# Patient Record
Sex: MALE | Race: WHITE | HISPANIC OR LATINO | Employment: FULL TIME | URBAN - METROPOLITAN AREA
[De-identification: names, ages, dates, MRNs, and addresses within clinical notes are randomized per-mention and may not be internally consistent; named-entity substitution may affect disease eponyms.]

---

## 2019-11-20 ENCOUNTER — HOSPITAL ENCOUNTER (OUTPATIENT)
Dept: RADIOLOGY | Facility: HOSPITAL | Age: 64
Discharge: HOME | End: 2019-11-20
Attending: ORTHOPAEDIC SURGERY
Payer: COMMERCIAL

## 2019-11-20 ENCOUNTER — OFFICE VISIT (OUTPATIENT)
Dept: SPORTS MEDICINE | Facility: CLINIC | Age: 64
End: 2019-11-20
Payer: COMMERCIAL

## 2019-11-20 VITALS
HEIGHT: 65 IN | WEIGHT: 182 LBS | SYSTOLIC BLOOD PRESSURE: 123 MMHG | BODY MASS INDEX: 30.32 KG/M2 | DIASTOLIC BLOOD PRESSURE: 72 MMHG | HEART RATE: 90 BPM

## 2019-11-20 DIAGNOSIS — M25.512 LEFT SHOULDER PAIN, UNSPECIFIED CHRONICITY: ICD-10-CM

## 2019-11-20 DIAGNOSIS — S46.012A TRAUMATIC COMPLETE TEAR OF LEFT ROTATOR CUFF, INITIAL ENCOUNTER: Primary | ICD-10-CM

## 2019-11-20 PROCEDURE — 99203 OFFICE O/P NEW LOW 30 MIN: CPT | Mod: S$GLB,,, | Performed by: ORTHOPAEDIC SURGERY

## 2019-11-20 PROCEDURE — 3008F BODY MASS INDEX DOCD: CPT | Mod: CPTII,S$GLB,, | Performed by: ORTHOPAEDIC SURGERY

## 2019-11-20 PROCEDURE — 99203 PR OFFICE/OUTPT VISIT, NEW, LEVL III, 30-44 MIN: ICD-10-PCS | Mod: S$GLB,,, | Performed by: ORTHOPAEDIC SURGERY

## 2019-11-20 PROCEDURE — 73030 X-RAY EXAM OF SHOULDER: CPT | Mod: TC,LT

## 2019-11-20 PROCEDURE — 99999 PR PBB SHADOW E&M-NEW PATIENT-LVL III: CPT | Mod: PBBFAC,,, | Performed by: ORTHOPAEDIC SURGERY

## 2019-11-20 PROCEDURE — 3008F PR BODY MASS INDEX (BMI) DOCUMENTED: ICD-10-PCS | Mod: CPTII,S$GLB,, | Performed by: ORTHOPAEDIC SURGERY

## 2019-11-20 PROCEDURE — 73030 XR SHOULDER COMPLETE 2 OR MORE VIEWS LEFT: ICD-10-PCS | Mod: 26,LT,, | Performed by: RADIOLOGY

## 2019-11-20 PROCEDURE — 99999 PR PBB SHADOW E&M-NEW PATIENT-LVL III: ICD-10-PCS | Mod: PBBFAC,,, | Performed by: ORTHOPAEDIC SURGERY

## 2019-11-20 PROCEDURE — 73030 X-RAY EXAM OF SHOULDER: CPT | Mod: 26,LT,, | Performed by: RADIOLOGY

## 2019-11-20 RX ORDER — TRAMADOL HYDROCHLORIDE 50 MG/1
50 TABLET ORAL EVERY 4 HOURS PRN
Qty: 30 TABLET | Refills: 0 | Status: SHIPPED | OUTPATIENT
Start: 2019-11-20 | End: 2019-11-22

## 2019-11-20 NOTE — PROGRESS NOTES
CC: left Shoulder pain    64 y.o. 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  He has not attempted any physical therapy he has tried some anti-inflammatories which does not help pain.  He had an MRI which shows full-thickness supraspinatus tear as well as slap and biceps tendinitis.  He reports that the pain is severe and not responding to any conservative care.      +trauma    He reports that the pain is worse with overhead activity. It also bothers him at night.    Is affecting ADLs.     PAST MEDICAL HISTORY:   Past Medical History:   Diagnosis Date    High cholesterol     Hypertension     Hypertrophy of prostate without urinary obstruction and other lower urinary tract symptoms (LUTS)     Nocturia     Type II or unspecified type diabetes mellitus without mention of complication, not stated as uncontrolled     Unspecified disorder of male genital organs      PAST SURGICAL HISTORY:   Past Surgical History:   Procedure Laterality Date    KNEE SURGERY       FAMILY HISTORY:   Family History   Family history unknown: Yes     SOCIAL HISTORY:   Social History     Socioeconomic History    Marital status:      Spouse name: Not on file    Number of children: Not on file    Years of education: Not on file    Highest education level: Not on file   Occupational History    Not on file   Social Needs    Financial resource strain: Not on file    Food insecurity:     Worry: Not on file     Inability: Not on file    Transportation needs:     Medical: Not on file     Non-medical: Not on file   Tobacco Use    Smoking status: Never Smoker    Smokeless tobacco: Never Used   Substance and Sexual Activity    Alcohol use: No    Drug use: No    Sexual activity: Yes   Lifestyle    Physical activity:     Days per week: Not on  "file     Minutes per session: Not on file    Stress: Not on file   Relationships    Social connections:     Talks on phone: Not on file     Gets together: Not on file     Attends [employer] service: Not on file     Active member of club or organization: Not on file     Attends meetings of clubs or organizations: Not on file     Relationship status: Not on file   Other Topics Concern    Not on file   Social History Narrative    Not on file     MEDICATIONS:   Current Outpatient Medications:     aspirin (ECOTRIN) 81 MG EC tablet, Take 81 mg by mouth once daily., Disp: , Rfl:     lisinopril 10 MG tablet, Take 10 mg by mouth once daily., Disp: , Rfl:     metformin (GLUCOPHAGE) 500 MG tablet, Take 500 mg by mouth 2 (two) times daily with meals., Disp: , Rfl:     nitroGLYCERIN (NITROSTAT) 0.4 MG SL tablet, Place 0.4 mg under the tongue every 5 (five) minutes as needed for Chest pain., Disp: , Rfl:     pravastatin (PRAVACHOL) 10 MG tablet, Take 10 mg by mouth once daily., Disp: , Rfl:     saxagliptin-metformin (KOMBIGLYZE XR) 5-1,000 mg TM24, Take by mouth., Disp: , Rfl:     tadalafil (CIALIS) 5 MG tablet, Take 1 tablet (5 mg total) by mouth daily as needed for Erectile Dysfunction., Disp: 30 tablet, Rfl: 11  ALLERGIES: Review of patient's allergies indicates:  No Known Allergies  VITAL SIGNS: /72   Pulse 90   Ht 5' 5" (1.651 m)   Wt 82.6 kg (182 lb)   BMI 30.29 kg/m²     Review of Systems   Constitution: Negative. Negative for chills, fever and night sweats.   HENT: Negative for congestion and headaches.    Eyes: Negative for blurred vision, left vision loss and right vision loss.   Cardiovascular: Negative for chest pain and syncope.   Respiratory: Negative for cough and shortness of breath.    Endocrine: Negative for polydipsia, polyphagia and polyuria.   Hematologic/Lymphatic: Negative for bleeding problem. Does not bruise/bleed easily.   Skin: Negative for dry skin, itching and rash. "   Musculoskeletal: Negative for falls and muscle weakness.   Gastrointestinal: Negative for abdominal pain and bowel incontinence.   Genitourinary: Negative for bladder incontinence and nocturia.   Neurological: Negative for disturbances in coordination, loss of balance and seizures.   Psychiatric/Behavioral: Negative for depression. The patient does not have insomnia.    Allergic/Immunologic: Negative for hives and persistent infections.       PHYSICAL EXAMINATION:  General:  The patient is alert and oriented x 3.  Mood is pleasant.  Observation of ears, eyes and nose reveal no gross abnormalities.  HEENT: NCAT, sclera nonicteric  Lungs: Respirations are equal and unlabored.  Gait is coordinated. Patient can toe walk and heel walk without difficulty.  Cardiovascular: There are no swelling or varicosities present.   Lymphatic: Negative for adenopathy       left Shoulder / Upper Extremity Exam    OBSERVATION:     Swelling  none  Deformity  none   Discoloration  none   Scapular winging none   Scars   none  Atrophy  none    TENDERNESS / CREPITUS (T/C):          T/C      T/C   Clavicle   -/-  SUPRAspinatus    -/-     AC Jt.    -/-  INFRAspinatus  -/-     SC Jt.    -/-  Deltoid    -/-     G. Tuberosity  -/-  LH BICEP groove  +/-   Acromion:  -/-  Midline Neck   -/-     Scapular Spine -/-  Trapezium   -/-   SMA Scapula  -/-  GH jt. line - post  -/-     Scapulothoracic  -/-         ROM: (* = with pain)  Right shoulder   Left shoulder        AROM (PROM)   AROM (PROM)   FE    90° (175°)     170° (175°)     ER at 0°    30°  (65°)    60°  (65°)   ER at 90° ABD  90°  (90°)    90°  (90°)°)      IR (spine level)   L3     T10    STRENGTH: (* = with pain) RIGHT SHOULDER  LEFT SHOULDER   SCAPTION at 0 *  5/5    4-/5    SCAPTION at 30 *  5/5    4-/5   IR    5/5    5/5   ER    5/5    4-/5   BICEPS   5/5    5/5   Deltoid    5/5    5/5     SIGNS:  Painful side       NEER   +   OALYSSAS  pos     STATON   +   SPEEDS  neg     DROP  ARM   neg   BELLY PRESS neg   Superior escape none    LIFT-OFF  neg   X-Body ADD    neg    MOVING VALGUS Neg        STABILITY TESTING    RIGHT SHOULDER   LEFT SHOULDER     Translation     Anterior  up face     up face     Posterior  up face    up face    Sulcus   < 10mm    < 10 mm     Signs    Apprehension   neg      neg      Relocation   no change     no change     Jerk test  neg     neg    EXTREMITY NEURO-VASCULAR EXAM    Sensation grossly intact to light touch all dermatomal regions.    DTR 2+ Biceps, Triceps, BR and Negative Aarons sign   Grossly intact motor function at Elbow, Wrist and Hand   Distal pulses radial and ulnar 2+, brisk cap refill, symmetric.      NECK:  Painless FROM and spinous processes non-tender. Negative Spurlings sign.      OTHER FINDINGS:  +scapular dyskinesia    XRAYS:  Shoulder trauma series left,  were obtained and reviewed  No convincing fracture or dislocation is noted. The osseous structures appear well mineralized and well aligned.    MRI Left shoulder: Full thickness supraspinatus tear without atrophy or retraction. SLAP, biceps tendinitis.       ASSESSMENT:    Left Shoulder Rotator Cuff Tear, SLAP, biceps tendonitis.      he would benefit from an arthroscopy, given the above.       PLAN:   Left Shoulder rotator cuff tear     All questions were answered, pt will contact us for questions or concerns in the interim.  Had thorough discussion of non-operative vs operative intervention, and risks and benefits of both.     We have discussed the surgery and recovery of arthroscopic shoulder surgery. he understands that there may be limited mobility up to several weeks after surgery depending on procedures that are performed at the time of surgery.    The spectrum of treatment options were discussed with the patient, including nonoperative and operative options.  After thorough discussion, the patient has elected to undergo surgical treatment to include:    left   a. Shoulder  arthroscopic rotator cuff repair   b. Shoulder arthroscopic SAD   c. Shoulder arthroscopic extensive debridement   d. Shoulder arthroscopic biceps tenodesis    e. Shoulder arthroscopic possible microfracture   f. Shoulder arthroscopic possible lysis of adhesions      The details of the surgical procedure were explained, including the location of probable incisions and a description of likely hardware and/or grafts to be used.  The patient understands the likely convalescence after surgery.  Also, we have thoroughly discussed the risks, benefits and alternatives to surgery, including, but not limited to, the risk of infection, joint stiffness, blood clot (including DVT and/or pulmonary embolus), neurologic and vascular injury.  It was explained that, if tissue has been repaired or reconstructed, there is a chance of failure, which may require further management.  Consent form for surgery is signed and in chart.    These risks include but are not limited to: bleeding, infection, scarring, re-tear of repair, irreparability of the tear, damage to neurovascular structures, damage to cartilage, stiffness, blood clots, pulmonary embolism, swelling, compartment syndrome, need for further surgery, and the risks of anesthesia. She verbalized her understanding of these risks and wished to proceed with surgery.   Time was spent face-to-face with the patient during this encounter on counseling about treatment options including surgery and coordination of her care for preoperative visits, surgery and post-operative rehab.

## 2019-11-21 DIAGNOSIS — S43.432A SUPERIOR GLENOID LABRUM LESION OF LEFT SHOULDER, INITIAL ENCOUNTER: ICD-10-CM

## 2019-11-21 DIAGNOSIS — S46.012A TRAUMATIC TEAR OF LEFT ROTATOR CUFF, UNSPECIFIED TEAR EXTENT, INITIAL ENCOUNTER: Primary | ICD-10-CM

## 2019-11-21 DIAGNOSIS — M75.22 BICEPS TENDINITIS OF LEFT UPPER EXTREMITY: ICD-10-CM

## 2019-11-22 PROBLEM — E78.5 HYPERLIPIDEMIA: Status: ACTIVE | Noted: 2019-11-22

## 2019-11-22 PROBLEM — E11.9 DIABETES MELLITUS WITHOUT COMPLICATION: Status: ACTIVE | Noted: 2019-11-22

## 2019-11-22 PROBLEM — G47.00 INSOMNIA: Status: ACTIVE | Noted: 2019-11-22

## 2019-11-22 PROBLEM — F41.9 ANXIETY: Status: ACTIVE | Noted: 2019-11-22

## 2019-11-22 PROBLEM — I10 HYPERTENSION, ESSENTIAL: Status: ACTIVE | Noted: 2019-11-22

## 2019-12-26 ENCOUNTER — TELEPHONE (OUTPATIENT)
Dept: SPORTS MEDICINE | Facility: CLINIC | Age: 64
End: 2019-12-26

## 2019-12-26 NOTE — TELEPHONE ENCOUNTER
----- Message from Keerthi Sharp sent at 12/26/2019  4:15 PM CST -----  Contact: pt@ [phone]  Calling to speak with someone in Dr. Covarrubias's office regarding his surgery date, says it was changed to 1/9, but Epic has not been updated to reflect the change. Please call.

## 2019-12-26 NOTE — TELEPHONE ENCOUNTER
I called the patient and apologized for the error, I informed him that I would get his surgery rescheduled to 1/9/20

## 2020-01-06 ENCOUNTER — OFFICE VISIT (OUTPATIENT)
Dept: SPORTS MEDICINE | Facility: CLINIC | Age: 65
End: 2020-01-06
Payer: COMMERCIAL

## 2020-01-06 VITALS
HEART RATE: 104 BPM | SYSTOLIC BLOOD PRESSURE: 137 MMHG | HEIGHT: 64 IN | WEIGHT: 171 LBS | DIASTOLIC BLOOD PRESSURE: 77 MMHG | BODY MASS INDEX: 29.19 KG/M2

## 2020-01-06 DIAGNOSIS — M75.22 BICEPS TENDINITIS OF LEFT UPPER EXTREMITY: ICD-10-CM

## 2020-01-06 DIAGNOSIS — S43.432A SUPERIOR GLENOID LABRUM LESION OF LEFT SHOULDER, INITIAL ENCOUNTER: ICD-10-CM

## 2020-01-06 DIAGNOSIS — M25.512 LEFT SHOULDER PAIN, UNSPECIFIED CHRONICITY: ICD-10-CM

## 2020-01-06 DIAGNOSIS — S46.012A TRAUMATIC TEAR OF LEFT ROTATOR CUFF, UNSPECIFIED TEAR EXTENT, INITIAL ENCOUNTER: Primary | ICD-10-CM

## 2020-01-06 DIAGNOSIS — G89.18 POST-OPERATIVE PAIN: ICD-10-CM

## 2020-01-06 PROCEDURE — 99499 UNLISTED E&M SERVICE: CPT | Mod: S$GLB,,, | Performed by: PHYSICIAN ASSISTANT

## 2020-01-06 PROCEDURE — 99499 NO LOS: ICD-10-PCS | Mod: S$GLB,,, | Performed by: PHYSICIAN ASSISTANT

## 2020-01-06 PROCEDURE — 99999 PR PBB SHADOW E&M-EST. PATIENT-LVL IV: CPT | Mod: PBBFAC,,, | Performed by: PHYSICIAN ASSISTANT

## 2020-01-06 PROCEDURE — 99999 PR PBB SHADOW E&M-EST. PATIENT-LVL IV: ICD-10-PCS | Mod: PBBFAC,,, | Performed by: PHYSICIAN ASSISTANT

## 2020-01-06 RX ORDER — TRAMADOL HYDROCHLORIDE 50 MG/1
50-100 TABLET ORAL EVERY 6 HOURS PRN
Qty: 21 TABLET | Refills: 0 | Status: SHIPPED | OUTPATIENT
Start: 2020-01-06 | End: 2020-02-21

## 2020-01-06 RX ORDER — PROMETHAZINE HYDROCHLORIDE 25 MG/1
25 TABLET ORAL EVERY 6 HOURS PRN
Qty: 12 TABLET | Refills: 0 | Status: SHIPPED | OUTPATIENT
Start: 2020-01-06 | End: 2020-09-01

## 2020-01-06 RX ORDER — OXYCODONE AND ACETAMINOPHEN 10; 325 MG/1; MG/1
TABLET ORAL
Qty: 21 TABLET | Refills: 0 | Status: SHIPPED | OUTPATIENT
Start: 2020-01-06 | End: 2020-01-24 | Stop reason: SDUPTHER

## 2020-01-08 ENCOUNTER — ANESTHESIA EVENT (OUTPATIENT)
Dept: SURGERY | Facility: HOSPITAL | Age: 65
End: 2020-01-08
Payer: COMMERCIAL

## 2020-01-08 RX ORDER — SODIUM CHLORIDE 9 MG/ML
INJECTION, SOLUTION INTRAVENOUS CONTINUOUS
Status: CANCELLED | OUTPATIENT
Start: 2020-01-08

## 2020-01-08 RX ORDER — OXYCODONE HCL 10 MG/1
10 TABLET, FILM COATED, EXTENDED RELEASE ORAL
Status: CANCELLED | OUTPATIENT
Start: 2020-01-08 | End: 2020-01-08

## 2020-01-08 NOTE — H&P
Chad Pringle  is here for a completion of his perioperative paperwork. he  Is scheduled to undergo     left              a. Shoulder arthroscopic rotator cuff repair              b. Shoulder arthroscopic SAD              c. Shoulder arthroscopic extensive debridement              d. Shoulder arthroscopic biceps tenodesis               e. Shoulder arthroscopic possible microfracture              f. Shoulder arthroscopic possible lysis of adhesions on 1/9/2020.      He is a healthy individual but does need clearance for this procedure which he states he has received from Dr. Franco. Had a negative cardiac stress test august 2019 which was normal. He is not having any CP, SOB, dyspnea at this time.     PAST MEDICAL HISTORY:   Past Medical History:   Diagnosis Date    High cholesterol     Hypertension     Hypertrophy of prostate without urinary obstruction and other lower urinary tract symptoms (LUTS)     Nocturia     Type II or unspecified type diabetes mellitus without mention of complication, not stated as uncontrolled     Unspecified disorder of male genital organs      PAST SURGICAL HISTORY:   Past Surgical History:   Procedure Laterality Date    KNEE SURGERY       FAMILY HISTORY:   Family History   Family history unknown: Yes     SOCIAL HISTORY:   Social History     Socioeconomic History    Marital status:      Spouse name: Not on file    Number of children: Not on file    Years of education: Not on file    Highest education level: Not on file   Occupational History    Not on file   Social Needs    Financial resource strain: Not on file    Food insecurity:     Worry: Not on file     Inability: Not on file    Transportation needs:     Medical: Not on file     Non-medical: Not on file   Tobacco Use    Smoking status: Never Smoker    Smokeless tobacco: Never Used   Substance and Sexual Activity    Alcohol use: No    Drug use: No    Sexual activity: Yes   Lifestyle    Physical activity:      Days per week: Not on file     Minutes per session: Not on file    Stress: Not on file   Relationships    Social connections:     Talks on phone: Not on file     Gets together: Not on file     Attends [employer] service: Not on file     Active member of club or organization: Not on file     Attends meetings of clubs or organizations: Not on file     Relationship status: Not on file   Other Topics Concern    Not on file   Social History Narrative    Not on file       MEDICATIONS:   Current Outpatient Medications:     ALPRAZolam (XANAX) 0.25 MG tablet, Take 1 tablet (0.25 mg total) by mouth daily as needed., Disp: 30 tablet, Rfl: 2    aspirin (ECOTRIN) 81 MG EC tablet, Take 81 mg by mouth once daily., Disp: , Rfl:     dapagliflozin (FARXIGA) 5 mg Tab tablet, Take 5 mg by mouth once daily., Disp: , Rfl:     gabapentin (NEURONTIN) 100 MG capsule, TAKE 1 CAPSULE BY MOUTH EVERY DAY AT NIGHT, Disp: 90 capsule, Rfl: 1    JANUMET XR 50-1,000 mg TM24, Take 1 tablet by mouth 2 (two) times daily., Disp: , Rfl: 2    lisinopril 10 MG tablet, Take 10 mg by mouth once daily., Disp: , Rfl:     nitroGLYCERIN (NITROSTAT) 0.4 MG SL tablet, Place 0.4 mg under the tongue every 5 (five) minutes as needed for Chest pain., Disp: , Rfl:     rosuvastatin (CRESTOR) 20 MG tablet, Take 20 mg by mouth every evening., Disp: , Rfl:     temazepam (RESTORIL) 30 mg capsule, Take 1 capsule (30 mg total) by mouth nightly as needed., Disp: 30 capsule, Rfl: 2    oxyCODONE-acetaminophen (PERCOCET)  mg per tablet, Take 1 tablet by mouth every 4-6 hours as needed for pain. Take stool softener with this medication., Disp: 21 tablet, Rfl: 0    promethazine (PHENERGAN) 25 MG tablet, Take 1 tablet (25 mg total) by mouth every 6 (six) hours as needed for Nausea., Disp: 12 tablet, Rfl: 0    traMADol (ULTRAM) 50 mg tablet, Take 1-2 tablets ( mg total) by mouth every 6 (six) hours as needed., Disp: 21 tablet, Rfl: 0  ALLERGIES: Review of  "patient's allergies indicates:  No Known Allergies    VITAL SIGNS: /77   Pulse 104   Ht 5' 4" (1.626 m)   Wt 77.6 kg (171 lb)   BMI 29.35 kg/m²      Risks, indications and benefits of the surgical procedure were discussed with the patient. All questions with regard to surgery, rehab, expected return to functional activities, activities of daily living and recreational endeavors were answered to his satisfaction.    It was explained to the patient that there may be an increase in surgical risks if the patient has certain co-morbidities such as but not limited to: Obesity, Cardiovascular issues (CHF, CAD, Arrhythmias), chronic pulmonary issues, previous or current neurovascular/neurological issues, previous strokes, diabetes mellitus, previous wound healing issues, previous wound or skin infections, PVD, clotting disorders, if the patient uses chronic steroids, if the patient takes or has immune compromising medications or diseases, or has previously or currently used tobacco products.     The patient verbalized that he/she does not have any additional clotting, bleeding, or blood disorders, other than what is list in her chart on today's review.     Then a brief history and physical exam were performed.    Review of Systems   Constitution: Negative. Negative for chills, fever and night sweats.   HENT: Negative for congestion and headaches.    Eyes: Negative for blurred vision, left vision loss and right vision loss.   Cardiovascular: Negative for chest pain and syncope.   Respiratory: Negative for cough and shortness of breath.    Endocrine: Negative for polydipsia, polyphagia and polyuria.   Hematologic/Lymphatic: Negative for bleeding problem. Does not bruise/bleed easily.   Skin: Negative for dry skin, itching and rash.   Musculoskeletal: Negative for falls and muscle weakness.   Gastrointestinal: Negative for abdominal pain and bowel incontinence.   Genitourinary: Negative for bladder incontinence and " nocturia.   Neurological: Negative for disturbances in coordination, loss of balance and seizures.   Psychiatric/Behavioral: Negative for depression. The patient does not have insomnia.    Allergic/Immunologic: Negative for hives and persistent infections.     PHYSICAL EXAM:  GEN: A&Ox3, WD WN NAD  HEENT: WNL  CHEST: CTAB, no W/R/R  HEART: RRR, no M/R/G  ABD: Soft, NT ND, BS x4 QUADS  MS; See Epic  NEURO: CN II-XII intact       The surgical consent was then reviewed with the patient, who agreed with all the contents of the consent form and it was signed. he was then given the surgery packet to bring with him to surgery center for the anesthesia portion of his perioperative paperwork (if needed)   For all physicians except for Dr. Hopper, we will email and possibly fax the consent forms and booking sheets to [facility].    The patient was given the opportunity to ask questions about the surgical plan and consent form, and once no other questions were asked, I proceeded with the pre-op appointment.    PHYSICAL THERAPY:  He was also instructed regarding physical therapy and will begin on  Likely 4 weeks after surgery. He was given a copy of the original prescription to schedule. Another copy of this prescription was also faxed to [facility].    POST OP CARE:instructions were reviewed including care of the wound and dressing after surgery and when he can shower.     CRUTCHES OR WALKER: It was explained to the patient that if they are having a lower extremity surgery that they will require either a walker or crutches to ambulate safely with after surgery. It was explained that a cane or other assistive devices are not sufficient to safely ambulate with after surgery. I explained to the patient that I will place an order for them to receive either crutches or a walker after surgery to go home with. It was explained that if they have crutches or a walker at home already, that they are REQUIRED to  bring them to the hospital on the day of surgery. It was explained that if they do not have them at the hospital on the day of surgery that they WILL be provided a new pair or crutches or a walker to go home with to ensure ambulation will be safe if the patient needs to stop somewhere on the way home.      PAIN MANAGEMENT: Chad Pringle was also given a pain management regime, which includes the TENS unit given to him by lemuel along with the education required for its use. He was also instructed regarding the Polar ice unit that will be in place after surgery and his postoperative pain medications.     PAIN MEDICATION:  Percocet 10/325mg 1 po q 4-6 hours prn pain  Ultram 50 mg Take 1-2 p.o. q.6 hours p.r.n. breakthrough pain,   Phenergan 25 mg one p.o. q.6 hours p.r.n. nausea and vomiting.    DVT prophylaxis was discussed with the patient today including risk factors for developing DVTs and history of DVTs. The patient was asked if any specific recommendations were given from the doctor/s that did pre-operative surgical clearance. The patient was then given an education sheet about DVTs and PE with warning signs and symptoms of both and steps to take if they suspect either of these.    This along with the Modified Caprini risk assessment model for VTE in general surgical patients was used to determine the patient's DVT risk.     From: Robert ARREGUIN, Ike DA, Stefania SM, et al. Prevention of VTE in nonorthopedic surgical patients: antithrombotic therapy and prevention of thrombosis, 9th ed: American College of Chest [employer]icians evidence-based clinical practical guidelines. Chest 2012; 141:e227S. Copyright © 2012. Reproduced with permission from the American College of Chest Physicians.    The below listed DVT prophylaxis regimen along with bilateral MISTY compression stockings will be used post-op. Length of treatment has been determined to be 10-42 days post-op by the above noted Caprini assessment model.     The patient was  instructed to buy and take:  Aspirin 81mg QD x 4 weeks for DVT prophylaxis starting on the morning after surgery.  Patient will also use bilateral TEDs on lower extremities, SCDs during surgery, and early ambulation post-op. If the patient was previously taking 81mg baby aspirin, they were told to not take it starting 5 days prior to surgery and to restart the 81mg aspirin after surgery.       Patient was also told to buy over the counter Prilosec medication if needed and take it once daily for GI protection as long as they are taking NSAIDs or Aspirin.    Patient denies history of seizures.     The patient was told that narcotic pain medications may make them drowsy and instructions were given to not sign legal documents, drive or operate heavy machinery, cars, or equipment while under the influence of narcotic medications. The patient was told and understands that narcotic pain medications should only be used as needed to control pain and that other options of pain control include TENs unit and ice packs/unit.     As there were no other questions to be asked, he was given my business card along with Rima Covarrubias MD business card if he has any questions or concerns prior to surgery or in the postop period.

## 2020-01-08 NOTE — H&P (VIEW-ONLY)
Chad Pringle  is here for a completion of his perioperative paperwork. he  Is scheduled to undergo     left              a. Shoulder arthroscopic rotator cuff repair              b. Shoulder arthroscopic SAD              c. Shoulder arthroscopic extensive debridement              d. Shoulder arthroscopic biceps tenodesis               e. Shoulder arthroscopic possible microfracture              f. Shoulder arthroscopic possible lysis of adhesions on 1/9/2020.      He is a healthy individual but does need clearance for this procedure which he states he has received from Dr. Franco. Had a negative cardiac stress test august 2019 which was normal. He is not having any CP, SOB, dyspnea at this time.     PAST MEDICAL HISTORY:   Past Medical History:   Diagnosis Date    High cholesterol     Hypertension     Hypertrophy of prostate without urinary obstruction and other lower urinary tract symptoms (LUTS)     Nocturia     Type II or unspecified type diabetes mellitus without mention of complication, not stated as uncontrolled     Unspecified disorder of male genital organs      PAST SURGICAL HISTORY:   Past Surgical History:   Procedure Laterality Date    KNEE SURGERY       FAMILY HISTORY:   Family History   Family history unknown: Yes     SOCIAL HISTORY:   Social History     Socioeconomic History    Marital status:      Spouse name: Not on file    Number of children: Not on file    Years of education: Not on file    Highest education level: Not on file   Occupational History    Not on file   Social Needs    Financial resource strain: Not on file    Food insecurity:     Worry: Not on file     Inability: Not on file    Transportation needs:     Medical: Not on file     Non-medical: Not on file   Tobacco Use    Smoking status: Never Smoker    Smokeless tobacco: Never Used   Substance and Sexual Activity    Alcohol use: No    Drug use: No    Sexual activity: Yes   Lifestyle    Physical activity:      Days per week: Not on file     Minutes per session: Not on file    Stress: Not on file   Relationships    Social connections:     Talks on phone: Not on file     Gets together: Not on file     Attends Anabaptism service: Not on file     Active member of club or organization: Not on file     Attends meetings of clubs or organizations: Not on file     Relationship status: Not on file   Other Topics Concern    Not on file   Social History Narrative    Not on file       MEDICATIONS:   Current Outpatient Medications:     ALPRAZolam (XANAX) 0.25 MG tablet, Take 1 tablet (0.25 mg total) by mouth daily as needed., Disp: 30 tablet, Rfl: 2    aspirin (ECOTRIN) 81 MG EC tablet, Take 81 mg by mouth once daily., Disp: , Rfl:     dapagliflozin (FARXIGA) 5 mg Tab tablet, Take 5 mg by mouth once daily., Disp: , Rfl:     gabapentin (NEURONTIN) 100 MG capsule, TAKE 1 CAPSULE BY MOUTH EVERY DAY AT NIGHT, Disp: 90 capsule, Rfl: 1    JANUMET XR 50-1,000 mg TM24, Take 1 tablet by mouth 2 (two) times daily., Disp: , Rfl: 2    lisinopril 10 MG tablet, Take 10 mg by mouth once daily., Disp: , Rfl:     nitroGLYCERIN (NITROSTAT) 0.4 MG SL tablet, Place 0.4 mg under the tongue every 5 (five) minutes as needed for Chest pain., Disp: , Rfl:     rosuvastatin (CRESTOR) 20 MG tablet, Take 20 mg by mouth every evening., Disp: , Rfl:     temazepam (RESTORIL) 30 mg capsule, Take 1 capsule (30 mg total) by mouth nightly as needed., Disp: 30 capsule, Rfl: 2    oxyCODONE-acetaminophen (PERCOCET)  mg per tablet, Take 1 tablet by mouth every 4-6 hours as needed for pain. Take stool softener with this medication., Disp: 21 tablet, Rfl: 0    promethazine (PHENERGAN) 25 MG tablet, Take 1 tablet (25 mg total) by mouth every 6 (six) hours as needed for Nausea., Disp: 12 tablet, Rfl: 0    traMADol (ULTRAM) 50 mg tablet, Take 1-2 tablets ( mg total) by mouth every 6 (six) hours as needed., Disp: 21 tablet, Rfl: 0  ALLERGIES: Review of  "patient's allergies indicates:  No Known Allergies    VITAL SIGNS: /77   Pulse 104   Ht 5' 4" (1.626 m)   Wt 77.6 kg (171 lb)   BMI 29.35 kg/m²      Risks, indications and benefits of the surgical procedure were discussed with the patient. All questions with regard to surgery, rehab, expected return to functional activities, activities of daily living and recreational endeavors were answered to his satisfaction.    It was explained to the patient that there may be an increase in surgical risks if the patient has certain co-morbidities such as but not limited to: Obesity, Cardiovascular issues (CHF, CAD, Arrhythmias), chronic pulmonary issues, previous or current neurovascular/neurological issues, previous strokes, diabetes mellitus, previous wound healing issues, previous wound or skin infections, PVD, clotting disorders, if the patient uses chronic steroids, if the patient takes or has immune compromising medications or diseases, or has previously or currently used tobacco products.     The patient verbalized that he/she does not have any additional clotting, bleeding, or blood disorders, other than what is list in her chart on today's review.     Then a brief history and physical exam were performed.    Review of Systems   Constitution: Negative. Negative for chills, fever and night sweats.   HENT: Negative for congestion and headaches.    Eyes: Negative for blurred vision, left vision loss and right vision loss.   Cardiovascular: Negative for chest pain and syncope.   Respiratory: Negative for cough and shortness of breath.    Endocrine: Negative for polydipsia, polyphagia and polyuria.   Hematologic/Lymphatic: Negative for bleeding problem. Does not bruise/bleed easily.   Skin: Negative for dry skin, itching and rash.   Musculoskeletal: Negative for falls and muscle weakness.   Gastrointestinal: Negative for abdominal pain and bowel incontinence.   Genitourinary: Negative for bladder incontinence and " nocturia.   Neurological: Negative for disturbances in coordination, loss of balance and seizures.   Psychiatric/Behavioral: Negative for depression. The patient does not have insomnia.    Allergic/Immunologic: Negative for hives and persistent infections.     PHYSICAL EXAM:  GEN: A&Ox3, WD WN NAD  HEENT: WNL  CHEST: CTAB, no W/R/R  HEART: RRR, no M/R/G  ABD: Soft, NT ND, BS x4 QUADS  MS; See Epic  NEURO: CN II-XII intact       The surgical consent was then reviewed with the patient, who agreed with all the contents of the consent form and it was signed. he was then given the surgery packet to bring with him to surgery center for the anesthesia portion of his perioperative paperwork (if needed)   For all physicians except for Dr. Hopper, we will email and possibly fax the consent forms and booking sheets to [facility].    The patient was given the opportunity to ask questions about the surgical plan and consent form, and once no other questions were asked, I proceeded with the pre-op appointment.    PHYSICAL THERAPY:  He was also instructed regarding physical therapy and will begin on  Likely 4 weeks after surgery. He was given a copy of the original prescription to schedule. Another copy of this prescription was also faxed to [facility].    POST OP CARE:instructions were reviewed including care of the wound and dressing after surgery and when he can shower.     CRUTCHES OR WALKER: It was explained to the patient that if they are having a lower extremity surgery that they will require either a walker or crutches to ambulate safely with after surgery. It was explained that a cane or other assistive devices are not sufficient to safely ambulate with after surgery. I explained to the patient that I will place an order for them to receive either crutches or a walker after surgery to go home with. It was explained that if they have crutches or a walker at home already, that they are REQUIRED to  bring them to the hospital on the day of surgery. It was explained that if they do not have them at the hospital on the day of surgery that they WILL be provided a new pair or crutches or a walker to go home with to ensure ambulation will be safe if the patient needs to stop somewhere on the way home.      PAIN MANAGEMENT: Chad Pringle was also given a pain management regime, which includes the TENS unit given to him by lemuel along with the education required for its use. He was also instructed regarding the Polar ice unit that will be in place after surgery and his postoperative pain medications.     PAIN MEDICATION:  Percocet 10/325mg 1 po q 4-6 hours prn pain  Ultram 50 mg Take 1-2 p.o. q.6 hours p.r.n. breakthrough pain,   Phenergan 25 mg one p.o. q.6 hours p.r.n. nausea and vomiting.    DVT prophylaxis was discussed with the patient today including risk factors for developing DVTs and history of DVTs. The patient was asked if any specific recommendations were given from the doctor/s that did pre-operative surgical clearance. The patient was then given an education sheet about DVTs and PE with warning signs and symptoms of both and steps to take if they suspect either of these.    This along with the Modified Caprini risk assessment model for VTE in general surgical patients was used to determine the patient's DVT risk.     From: Robert ARREGUIN, Ike DA, Stefania SM, et al. Prevention of VTE in nonorthopedic surgical patients: antithrombotic therapy and prevention of thrombosis, 9th ed: American College of Chest Physicians evidence-based clinical practical guidelines. Chest 2012; 141:e227S. Copyright © 2012. Reproduced with permission from the American College of Chest Physicians.    The below listed DVT prophylaxis regimen along with bilateral MISTY compression stockings will be used post-op. Length of treatment has been determined to be 10-42 days post-op by the above noted Caprini assessment model.     The patient was  instructed to buy and take:  Aspirin 81mg QD x 4 weeks for DVT prophylaxis starting on the morning after surgery.  Patient will also use bilateral TEDs on lower extremities, SCDs during surgery, and early ambulation post-op. If the patient was previously taking 81mg baby aspirin, they were told to not take it starting 5 days prior to surgery and to restart the 81mg aspirin after surgery.       Patient was also told to buy over the counter Prilosec medication if needed and take it once daily for GI protection as long as they are taking NSAIDs or Aspirin.    Patient denies history of seizures.     The patient was told that narcotic pain medications may make them drowsy and instructions were given to not sign legal documents, drive or operate heavy machinery, cars, or equipment while under the influence of narcotic medications. The patient was told and understands that narcotic pain medications should only be used as needed to control pain and that other options of pain control include TENs unit and ice packs/unit.     As there were no other questions to be asked, he was given my business card along with Rima Covarrubias MD business card if he has any questions or concerns prior to surgery or in the postop period.

## 2020-01-09 ENCOUNTER — ANESTHESIA (OUTPATIENT)
Dept: SURGERY | Facility: HOSPITAL | Age: 65
End: 2020-01-09
Payer: COMMERCIAL

## 2020-01-09 ENCOUNTER — HOSPITAL ENCOUNTER (OUTPATIENT)
Facility: HOSPITAL | Age: 65
Discharge: HOME | End: 2020-01-09
Attending: ORTHOPAEDIC SURGERY | Admitting: ORTHOPAEDIC SURGERY
Payer: COMMERCIAL

## 2020-01-09 VITALS
RESPIRATION RATE: 12 BRPM | OXYGEN SATURATION: 96 % | WEIGHT: 166 LBS | HEIGHT: 63 IN | SYSTOLIC BLOOD PRESSURE: 152 MMHG | HEART RATE: 101 BPM | DIASTOLIC BLOOD PRESSURE: 82 MMHG | BODY MASS INDEX: 29.41 KG/M2 | TEMPERATURE: 98 F

## 2020-01-09 DIAGNOSIS — M25.512 LEFT SHOULDER PAIN, UNSPECIFIED CHRONICITY: ICD-10-CM

## 2020-01-09 DIAGNOSIS — M75.22 BICEPS TENDINITIS OF LEFT UPPER EXTREMITY: ICD-10-CM

## 2020-01-09 DIAGNOSIS — S43.432A SUPERIOR GLENOID LABRUM LESION OF LEFT SHOULDER, INITIAL ENCOUNTER: ICD-10-CM

## 2020-01-09 DIAGNOSIS — S46.012A TRAUMATIC TEAR OF LEFT ROTATOR CUFF, UNSPECIFIED TEAR EXTENT, INITIAL ENCOUNTER: ICD-10-CM

## 2020-01-09 LAB
GLUCOSE SERPL-MCNC: 213 MG/DL (ref 70–110)
POCT GLUCOSE: 169 MG/DL (ref 70–110)
POCT GLUCOSE: 213 MG/DL (ref 70–110)

## 2020-01-09 PROCEDURE — 27100025 HC TUBING, SET FLUID WARMER: Performed by: ANESTHESIOLOGY

## 2020-01-09 PROCEDURE — 63600175 PHARM REV CODE 636 W HCPCS: Performed by: ANESTHESIOLOGY

## 2020-01-09 PROCEDURE — 29828 SHO ARTHRS SRG BICP TENODSIS: CPT | Mod: 51,52,LT, | Performed by: ORTHOPAEDIC SURGERY

## 2020-01-09 PROCEDURE — 71000039 HC RECOVERY, EACH ADD'L HOUR: Performed by: ORTHOPAEDIC SURGERY

## 2020-01-09 PROCEDURE — 63600175 PHARM REV CODE 636 W HCPCS: Performed by: STUDENT IN AN ORGANIZED HEALTH CARE EDUCATION/TRAINING PROGRAM

## 2020-01-09 PROCEDURE — 94761 N-INVAS EAR/PLS OXIMETRY MLT: CPT

## 2020-01-09 PROCEDURE — 25000003 PHARM REV CODE 250: Performed by: ANESTHESIOLOGY

## 2020-01-09 PROCEDURE — 71000015 HC POSTOP RECOV 1ST HR: Performed by: ORTHOPAEDIC SURGERY

## 2020-01-09 PROCEDURE — D9220A PRA ANESTHESIA: ICD-10-PCS | Mod: ANES,,, | Performed by: ANESTHESIOLOGY

## 2020-01-09 PROCEDURE — C1713 ANCHOR/SCREW BN/BN,TIS/BN: HCPCS | Performed by: ORTHOPAEDIC SURGERY

## 2020-01-09 PROCEDURE — 29826 SHO ARTHRS SRG DECOMPRESSION: CPT | Mod: LT,,, | Performed by: ORTHOPAEDIC SURGERY

## 2020-01-09 PROCEDURE — 36000710: Performed by: ORTHOPAEDIC SURGERY

## 2020-01-09 PROCEDURE — 25000003 PHARM REV CODE 250: Performed by: PHYSICIAN ASSISTANT

## 2020-01-09 PROCEDURE — 71000033 HC RECOVERY, INTIAL HOUR: Performed by: ORTHOPAEDIC SURGERY

## 2020-01-09 PROCEDURE — 29827 PR SHLDR ARTHROSCOP,SURG,W/ROTAT CUFF REPR: ICD-10-PCS | Mod: 22,LT,, | Performed by: ORTHOPAEDIC SURGERY

## 2020-01-09 PROCEDURE — 27201423 OPTIME MED/SURG SUP & DEVICES STERILE SUPPLY: Performed by: ORTHOPAEDIC SURGERY

## 2020-01-09 PROCEDURE — 29827 SHO ARTHRS SRG RT8TR CUF RPR: CPT | Mod: 22,LT,, | Performed by: ORTHOPAEDIC SURGERY

## 2020-01-09 PROCEDURE — 36000711: Performed by: ORTHOPAEDIC SURGERY

## 2020-01-09 PROCEDURE — D9220A PRA ANESTHESIA: Mod: ANES,,, | Performed by: ANESTHESIOLOGY

## 2020-01-09 PROCEDURE — 29823 SHO ARTHRS SRG XTNSV DBRDMT: CPT | Mod: 51,LT,, | Performed by: ORTHOPAEDIC SURGERY

## 2020-01-09 PROCEDURE — 29828 PR ARTHROSCOPY SHOULDER SURGICAL BICEPS TENODESIS: ICD-10-PCS | Mod: 51,52,LT, | Performed by: ORTHOPAEDIC SURGERY

## 2020-01-09 PROCEDURE — 25000003 PHARM REV CODE 250: Performed by: STUDENT IN AN ORGANIZED HEALTH CARE EDUCATION/TRAINING PROGRAM

## 2020-01-09 PROCEDURE — 25000003 PHARM REV CODE 250: Performed by: NURSE ANESTHETIST, CERTIFIED REGISTERED

## 2020-01-09 PROCEDURE — 63600175 PHARM REV CODE 636 W HCPCS: Performed by: ORTHOPAEDIC SURGERY

## 2020-01-09 PROCEDURE — 25000003 PHARM REV CODE 250: Performed by: ORTHOPAEDIC SURGERY

## 2020-01-09 PROCEDURE — D9220A PRA ANESTHESIA: ICD-10-PCS | Mod: CRNA,,, | Performed by: NURSE ANESTHETIST, CERTIFIED REGISTERED

## 2020-01-09 PROCEDURE — 37000008 HC ANESTHESIA 1ST 15 MINUTES: Performed by: ORTHOPAEDIC SURGERY

## 2020-01-09 PROCEDURE — 29826 PR SHLDR ARTHROSCOP,PART ACROMIOPLAS: ICD-10-PCS | Mod: LT,,, | Performed by: ORTHOPAEDIC SURGERY

## 2020-01-09 PROCEDURE — 63600175 PHARM REV CODE 636 W HCPCS: Performed by: NURSE ANESTHETIST, CERTIFIED REGISTERED

## 2020-01-09 PROCEDURE — 37000009 HC ANESTHESIA EA ADD 15 MINS: Performed by: ORTHOPAEDIC SURGERY

## 2020-01-09 PROCEDURE — S0077 INJECTION, CLINDAMYCIN PHOSP: HCPCS | Performed by: PHYSICIAN ASSISTANT

## 2020-01-09 PROCEDURE — 99900035 HC TECH TIME PER 15 MIN (STAT)

## 2020-01-09 PROCEDURE — D9220A PRA ANESTHESIA: Mod: CRNA,,, | Performed by: NURSE ANESTHETIST, CERTIFIED REGISTERED

## 2020-01-09 PROCEDURE — 63600175 PHARM REV CODE 636 W HCPCS: Performed by: PHYSICIAN ASSISTANT

## 2020-01-09 PROCEDURE — 82962 GLUCOSE BLOOD TEST: CPT | Performed by: ORTHOPAEDIC SURGERY

## 2020-01-09 PROCEDURE — 29823 PR SHLDR ARTHROSCOP,EXTEN DEBRIDE: ICD-10-PCS | Mod: 51,LT,, | Performed by: ORTHOPAEDIC SURGERY

## 2020-01-09 DEVICE — ANCHOR SUT FT BIOCRKSCR 6.5MM: Type: IMPLANTABLE DEVICE | Site: SHOULDER | Status: FUNCTIONAL

## 2020-01-09 DEVICE — ANCHOR BIOCOMP SWVLLOK: Type: IMPLANTABLE DEVICE | Site: SHOULDER | Status: FUNCTIONAL

## 2020-01-09 RX ORDER — ACETAMINOPHEN 500 MG
1000 TABLET ORAL ONCE
Status: COMPLETED | OUTPATIENT
Start: 2020-01-09 | End: 2020-01-09

## 2020-01-09 RX ORDER — ONDANSETRON 4 MG/1
4 TABLET, ORALLY DISINTEGRATING ORAL ONCE
Status: DISCONTINUED | OUTPATIENT
Start: 2020-01-09 | End: 2020-01-09 | Stop reason: HOSPADM

## 2020-01-09 RX ORDER — MORPHINE SULFATE 2 MG/ML
3 INJECTION, SOLUTION INTRAMUSCULAR; INTRAVENOUS
Status: DISCONTINUED | OUTPATIENT
Start: 2020-01-09 | End: 2020-01-09 | Stop reason: HOSPADM

## 2020-01-09 RX ORDER — ONDANSETRON 2 MG/ML
4 INJECTION INTRAMUSCULAR; INTRAVENOUS DAILY PRN
Status: DISCONTINUED | OUTPATIENT
Start: 2020-01-09 | End: 2020-01-09 | Stop reason: HOSPADM

## 2020-01-09 RX ORDER — KETOROLAC TROMETHAMINE 30 MG/ML
INJECTION, SOLUTION INTRAMUSCULAR; INTRAVENOUS
Status: DISCONTINUED | OUTPATIENT
Start: 2020-01-09 | End: 2020-01-09 | Stop reason: HOSPADM

## 2020-01-09 RX ORDER — SODIUM CHLORIDE 9 MG/ML
INJECTION, SOLUTION INTRAVENOUS CONTINUOUS
Status: DISCONTINUED | OUTPATIENT
Start: 2020-01-09 | End: 2020-01-09 | Stop reason: HOSPADM

## 2020-01-09 RX ORDER — EPINEPHRINE 1 MG/ML
INJECTION, SOLUTION INTRACARDIAC; INTRAMUSCULAR; INTRAVENOUS; SUBCUTANEOUS
Status: DISCONTINUED | OUTPATIENT
Start: 2020-01-09 | End: 2020-01-09 | Stop reason: HOSPADM

## 2020-01-09 RX ORDER — OXYCODONE HCL 10 MG/1
10 TABLET, FILM COATED, EXTENDED RELEASE ORAL
Status: COMPLETED | OUTPATIENT
Start: 2020-01-09 | End: 2020-01-09

## 2020-01-09 RX ORDER — ROCURONIUM BROMIDE 10 MG/ML
INJECTION, SOLUTION INTRAVENOUS
Status: DISCONTINUED | OUTPATIENT
Start: 2020-01-09 | End: 2020-01-09

## 2020-01-09 RX ORDER — KETAMINE HYDROCHLORIDE 100 MG/ML
INJECTION, SOLUTION INTRAMUSCULAR; INTRAVENOUS
Status: DISCONTINUED | OUTPATIENT
Start: 2020-01-09 | End: 2020-01-09 | Stop reason: HOSPADM

## 2020-01-09 RX ORDER — CLINDAMYCIN PHOSPHATE 900 MG/50ML
900 INJECTION, SOLUTION INTRAVENOUS
Status: COMPLETED | OUTPATIENT
Start: 2020-01-09 | End: 2020-01-09

## 2020-01-09 RX ORDER — PROPOFOL 10 MG/ML
VIAL (ML) INTRAVENOUS
Status: DISCONTINUED | OUTPATIENT
Start: 2020-01-09 | End: 2020-01-09

## 2020-01-09 RX ORDER — ROPIVACAINE HYDROCHLORIDE 5 MG/ML
INJECTION, SOLUTION EPIDURAL; INFILTRATION; PERINEURAL
Status: DISCONTINUED | OUTPATIENT
Start: 2020-01-09 | End: 2020-01-09 | Stop reason: HOSPADM

## 2020-01-09 RX ORDER — LIDOCAINE HCL/PF 100 MG/5ML
SYRINGE (ML) INTRAVENOUS
Status: DISCONTINUED | OUTPATIENT
Start: 2020-01-09 | End: 2020-01-09

## 2020-01-09 RX ORDER — KETAMINE HCL IN 0.9 % NACL 50 MG/5 ML
SYRINGE (ML) INTRAVENOUS
Status: DISCONTINUED | OUTPATIENT
Start: 2020-01-09 | End: 2020-01-09

## 2020-01-09 RX ORDER — FENTANYL CITRATE 50 UG/ML
25 INJECTION, SOLUTION INTRAMUSCULAR; INTRAVENOUS EVERY 5 MIN PRN
Status: COMPLETED | OUTPATIENT
Start: 2020-01-09 | End: 2020-01-09

## 2020-01-09 RX ORDER — ESMOLOL HYDROCHLORIDE 10 MG/ML
INJECTION INTRAVENOUS
Status: DISCONTINUED | OUTPATIENT
Start: 2020-01-09 | End: 2020-01-09

## 2020-01-09 RX ORDER — FENTANYL CITRATE 50 UG/ML
INJECTION, SOLUTION INTRAMUSCULAR; INTRAVENOUS
Status: DISCONTINUED | OUTPATIENT
Start: 2020-01-09 | End: 2020-01-09

## 2020-01-09 RX ORDER — KETOROLAC TROMETHAMINE 30 MG/ML
30 INJECTION, SOLUTION INTRAMUSCULAR; INTRAVENOUS ONCE
Status: COMPLETED | OUTPATIENT
Start: 2020-01-09 | End: 2020-01-09

## 2020-01-09 RX ORDER — MIDAZOLAM HYDROCHLORIDE 1 MG/ML
INJECTION, SOLUTION INTRAMUSCULAR; INTRAVENOUS
Status: DISCONTINUED | OUTPATIENT
Start: 2020-01-09 | End: 2020-01-09

## 2020-01-09 RX ORDER — DEXAMETHASONE SODIUM PHOSPHATE 4 MG/ML
INJECTION, SOLUTION INTRA-ARTICULAR; INTRALESIONAL; INTRAMUSCULAR; INTRAVENOUS; SOFT TISSUE
Status: DISCONTINUED | OUTPATIENT
Start: 2020-01-09 | End: 2020-01-09

## 2020-01-09 RX ORDER — OXYCODONE AND ACETAMINOPHEN 5; 325 MG/1; MG/1
1 TABLET ORAL EVERY 4 HOURS PRN
Status: DISCONTINUED | OUTPATIENT
Start: 2020-01-09 | End: 2020-01-09 | Stop reason: HOSPADM

## 2020-01-09 RX ADMIN — MIDAZOLAM 2 MG: 1 INJECTION INTRAMUSCULAR; INTRAVENOUS at 06:01

## 2020-01-09 RX ADMIN — ESMOLOL HYDROCHLORIDE 20 MG: 10 INJECTION INTRAVENOUS at 07:01

## 2020-01-09 RX ADMIN — FENTANYL CITRATE 50 MCG: 50 INJECTION, SOLUTION INTRAMUSCULAR; INTRAVENOUS at 08:01

## 2020-01-09 RX ADMIN — ROCURONIUM BROMIDE 45 MG: 10 INJECTION, SOLUTION INTRAVENOUS at 07:01

## 2020-01-09 RX ADMIN — SODIUM CHLORIDE, SODIUM GLUCONATE, SODIUM ACETATE, POTASSIUM CHLORIDE, MAGNESIUM CHLORIDE, SODIUM PHOSPHATE, DIBASIC, AND POTASSIUM PHOSPHATE: .53; .5; .37; .037; .03; .012; .00082 INJECTION, SOLUTION INTRAVENOUS at 07:01

## 2020-01-09 RX ADMIN — ESMOLOL HYDROCHLORIDE 30 MG: 10 INJECTION INTRAVENOUS at 07:01

## 2020-01-09 RX ADMIN — CLINDAMYCIN PHOSPHATE 900 MG: 18 INJECTION, SOLUTION INTRAVENOUS at 07:01

## 2020-01-09 RX ADMIN — FENTANYL CITRATE 50 MCG: 50 INJECTION, SOLUTION INTRAMUSCULAR; INTRAVENOUS at 07:01

## 2020-01-09 RX ADMIN — PROPOFOL 50 MG: 10 INJECTION, EMULSION INTRAVENOUS at 07:01

## 2020-01-09 RX ADMIN — ACETAMINOPHEN 1000 MG: 500 TABLET ORAL at 11:01

## 2020-01-09 RX ADMIN — MORPHINE SULFATE 3 MG: 2 INJECTION, SOLUTION INTRAMUSCULAR; INTRAVENOUS at 10:01

## 2020-01-09 RX ADMIN — ONDANSETRON 4 MG: 2 INJECTION INTRAMUSCULAR; INTRAVENOUS at 11:01

## 2020-01-09 RX ADMIN — LIDOCAINE HYDROCHLORIDE 100 MG: 20 INJECTION, SOLUTION INTRAVENOUS at 07:01

## 2020-01-09 RX ADMIN — FENTANYL CITRATE 25 MCG: 50 INJECTION INTRAMUSCULAR; INTRAVENOUS at 09:01

## 2020-01-09 RX ADMIN — SODIUM CHLORIDE: 0.9 INJECTION, SOLUTION INTRAVENOUS at 06:01

## 2020-01-09 RX ADMIN — Medication 20 MG: at 07:01

## 2020-01-09 RX ADMIN — PROPOFOL 150 MG: 10 INJECTION, EMULSION INTRAVENOUS at 07:01

## 2020-01-09 RX ADMIN — OXYCODONE HYDROCHLORIDE AND ACETAMINOPHEN 1 TABLET: 5; 325 TABLET ORAL at 09:01

## 2020-01-09 RX ADMIN — OXYCODONE HYDROCHLORIDE 10 MG: 10 TABLET, FILM COATED, EXTENDED RELEASE ORAL at 05:01

## 2020-01-09 RX ADMIN — ROCURONIUM BROMIDE 5 MG: 10 INJECTION, SOLUTION INTRAVENOUS at 07:01

## 2020-01-09 RX ADMIN — KETOROLAC TROMETHAMINE 30 MG: 30 INJECTION, SOLUTION INTRAMUSCULAR at 11:01

## 2020-01-09 RX ADMIN — DEXAMETHASONE SODIUM PHOSPHATE 8 MG: 4 INJECTION, SOLUTION INTRAMUSCULAR; INTRAVENOUS at 07:01

## 2020-01-09 NOTE — PROGRESS NOTES
Dr. Ramirez notified that pt pain remains 8/10 despite po percocet and iv fentanyl per orders.  Per Dr. Ramirez ok to administer morphine 3 mg as per prn post op order.

## 2020-01-09 NOTE — DISCHARGE INSTRUCTIONS
[employer] CARE CUBE COLD THERAPY SYSTEM  The Polar Care Cube Cold Therapy System is simple and reliable. It is easy to use, compact design makes it great for home use. With the addition of ice and water, you will enjoy 6-8 hours of effortless cold therapy. Proper use requires an insulation barrier between the pad and the patient's skin.  Instructions on how to use the Polar Care Cube Cold Therapy System Below:                  
oral

## 2020-01-09 NOTE — ANESTHESIA PREPROCEDURE EVALUATION
01/09/2020  Chad Pringle is a 64 y.o., male.    Anesthesia Evaluation    I have reviewed the Patient Summary Reports.    I have reviewed the Nursing Notes.   I have reviewed the Medications.     Review of Systems  Anesthesia Hx:  No problems with previous Anesthesia  History of prior surgery of interest to airway management or planning: Denies Family Hx of Anesthesia complications.   Denies Personal Hx of Anesthesia complications.   Social:  Non-Smoker    Hematology/Oncology:  Hematology Normal   Oncology Normal     EENT/Dental:EENT/Dental Normal   Cardiovascular:   Hypertension    Pulmonary:  Pulmonary Normal    Renal/:  Renal/ Normal     Hepatic/GI:  Hepatic/GI Normal    Musculoskeletal:  Musculoskeletal Normal    Neurological:  Neurology Normal    Endocrine:   Diabetes    Psych:  Psychiatric Normal           Physical Exam  General:  Well nourished    Airway/Jaw/Neck:  Airway Findings: Mouth Opening: Small, but > 3cm Tongue: Normal  General Airway Assessment: Adult, Possible difficult intubation  Mallampati: IV  Improves to III with phonation.  TM Distance: < 4 cm  Jaw/Neck Findings:  Neck ROM: Normal ROM      Dental:  Dental Findings: In tact   Chest/Lungs:  Chest/Lungs Findings: Clear to auscultation, Normal Respiratory Rate     Heart/Vascular:  Heart Findings: Rate: Normal  Rhythm: Regular Rhythm  Sounds: Normal        Mental Status:  Mental Status Findings:  Cooperative, Alert and Oriented         Anesthesia Plan  Type of Anesthesia, risks & benefits discussed:  Anesthesia Type:  general  Patient's Preference:   Intra-op Monitoring Plan: standard ASA monitors  Intra-op Monitoring Plan Comments:   Post Op Pain Control Plan: multimodal analgesia  Post Op Pain Control Plan Comments:   Induction:   IV  Beta Blocker:  Patient is not currently on a Beta-Blocker (No further documentation required).        Informed Consent: Patient understands risks and agrees with Anesthesia plan.  Questions answered. Anesthesia consent signed with patient.  ASA Score: 2     Day of Surgery Review of History & Physical:    H&P update referred to the surgeon.         Ready For Surgery From Anesthesia Perspective.

## 2020-01-09 NOTE — PROGRESS NOTES
Pt reports improvement in pain to a tolerable 4/10.  Pt sitting up on bedside getting dressed with assistance and complaints of nausea/dizziness.   Zofran administered. NS bolus 500 ml admin per MD order.

## 2020-01-09 NOTE — PROGRESS NOTES
Pt reports pain 8/10 despite po and iv medication.  Polar ice pack readjusted and arm sling adjusted.  Radial pulse 2+ to lue. Pt can wiggle fingers.   Pt reports pain radiating to forearm and wrist with improvement with gentle ROM.  Pt is resting on stretcher with eyes closed. Wife is at bedside.

## 2020-01-09 NOTE — TRANSFER OF CARE
"Anesthesia Transfer of Care Note    Patient: Chad Pringle    Procedure(s) Performed: Procedure(s) (LRB):  REPAIR, ROTATOR CUFF, ARTHROSCOPIC (Left)  FIXATION, TENDON, Biceps Tenodesis (Left)  DEBRIDEMENT, SHOULDER, ARTHROSCOPIC (Left)  MANIPULATION, WITH ANESTHESIA, Lysis of Adhesions (Left)  MICROFRACTURE (Left)    Patient location: PACU    Anesthesia Type: general    Transport from OR: Transported from OR on 6-10 L/min O2 by face mask with adequate spontaneous ventilation    Post pain: adequate analgesia    Post assessment: no apparent anesthetic complications    Post vital signs: stable    Level of consciousness: sedated    Nausea/Vomiting: no nausea/vomiting    Complications: none    Transfer of care protocol was followed      Last vitals:   Visit Vitals  /80 (BP Location: Right arm)   Pulse 69   Temp 36.6 °C (97.9 °F) (Oral)   Resp 18   Ht 5' 3" (1.6 m)   Wt 75.3 kg (166 lb)   SpO2 98%   BMI 29.41 kg/m²     "

## 2020-01-09 NOTE — PLAN OF CARE
VSS.  Patient tolerating oral liquids without difficulty. No apparent s&s of distress noted at this time, no complaints voiced at this time.  Pt rates pain tolerable and is denying nausea at this time. Polar ice intact, arm sling intact. Post operative medications sent to [facility] in [facility].  Pt able to void prior to d/c without difficulty.  Discharge instructions reviewed with patient and spouse with good verbal feedback received. Patient ready for discharge

## 2020-01-09 NOTE — PROGRESS NOTES
Dr. Ramirez notified of pt complaints of pain.   Dr. Ramirez with new orders for multimodal pain medications.

## 2020-01-09 NOTE — ANESTHESIA POSTPROCEDURE EVALUATION
Anesthesia Post Evaluation    Patient: Chad Pringle    Procedure(s) Performed: Procedure(s) (LRB):  REPAIR, ROTATOR CUFF, ARTHROSCOPIC (Left)  FIXATION, TENDON, Biceps Tenodesis (Left)  DEBRIDEMENT, SHOULDER, ARTHROSCOPIC (Left)  MANIPULATION, WITH ANESTHESIA, Lysis of Adhesions (Left)  MICROFRACTURE (Left)    Final Anesthesia Type: general    Patient location during evaluation: PACU  Patient participation: Yes- Able to Participate  Level of consciousness: awake and alert  Post-procedure vital signs: reviewed and stable  Pain management: adequate  Airway patency: patent    PONV status at discharge: No PONV  Anesthetic complications: no      Cardiovascular status: blood pressure returned to baseline  Respiratory status: unassisted, spontaneous ventilation and room air  Hydration status: euvolemic  Follow-up not needed.          Vitals Value Taken Time   /89 1/9/2020  9:47 AM   Temp 36.7 °C (98.1 °F) 1/9/2020  9:16 AM   Pulse 96 1/9/2020  9:48 AM   Resp 13 1/9/2020  9:48 AM   SpO2 97 % 1/9/2020  9:48 AM   Vitals shown include unvalidated device data.      No case tracking events are documented in the log.      Pain/Sharon Score: Pain Rating Prior to Med Admin: 9 (1/9/2020  9:46 AM)  Sharon Score: 7 (1/9/2020  9:16 AM)

## 2020-01-09 NOTE — DISCHARGE SUMMARY
[facility]  Orthopedics  Discharge Summary      Patient Name: Chad Prnigle  MRN: [id number]  Admission Date: 1/9/2020  Hospital Length of Stay: 0 days  Discharge Date and Time: No discharge date for patient encounter.  Attending Physician: Rima Covarrubias MD   Discharging Provider: Eloy Flores MD  Primary Care Provider: Danie Woodall MD    HPI: see hpi    Procedure(s) (LRB):  REPAIR, ROTATOR CUFF, ARTHROSCOPIC (Left)  FIXATION, TENDON, Biceps Tenodesis (Left)  DEBRIDEMENT, SHOULDER, ARTHROSCOPIC (Left)  MANIPULATION, WITH ANESTHESIA, Lysis of Adhesions (Left)  MICROFRACTURE (Left)      Hospital Course: Patient presented for above procedure.  Tolerated it well and was discharged home POD 0 after voiding, tolerating diet, ambulating, pain controlled.  Discharge instructions, follow-up appointment, and med rec are below.          Significant Diagnostic Studies: No pertinent studies.    Pending Diagnostic Studies:     None        Final Active Diagnoses:    Diagnosis Date Noted POA    PRINCIPAL PROBLEM:  Traumatic tear of left rotator cuff [S46.012A] 01/09/2020 Yes      Problems Resolved During this Admission:      Discharged Condition: stable    Disposition: Home or Self Care    Follow Up:  Follow-up Information     Follow up In 2 weeks.               Patient Instructions:      Call MD for:  temperature >100.4     Call MD for:  persistent nausea and vomiting or diarrhea     Call MD for:  severe uncontrolled pain     Call MD for:  redness, tenderness, or signs of infection (pain, swelling, redness, odor or green/yellow discharge around incision site)     Call MD for:  difficulty breathing or increased cough     Call MD for:  severe persistent headache     Call MD for:  worsening rash     Call MD for:  persistent dizziness, light-headedness, or visual disturbances     Call MD for:  increased confusion or weakness     Medications:  Reconciled Home Medications:      Medication List      CONTINUE  taking these medications    ALPRAZolam 0.25 MG tablet  Commonly known as:  XANAX  Take 1 tablet (0.25 mg total) by mouth daily as needed.     aspirin 81 MG EC tablet  Commonly known as:  ECOTRIN  Take 81 mg by mouth once daily.     dapagliflozin 5 mg Tab tablet  Commonly known as:  FARXIGA  Take 5 mg by mouth once daily.     gabapentin 100 MG capsule  Commonly known as:  NEURONTIN  TAKE 1 CAPSULE BY MOUTH EVERY DAY AT NIGHT     Janumet XR 50-1,000 mg Tm24  Generic drug:  SITagliptan-metformin  Take 1 tablet by mouth 2 (two) times daily.     lisinopril 10 MG tablet  Take 10 mg by mouth once daily.     nitroGLYCERIN 0.4 MG SL tablet  Commonly known as:  NITROSTAT  Place 0.4 mg under the tongue every 5 (five) minutes as needed for Chest pain.     oxyCODONE-acetaminophen  mg per tablet  Commonly known as:  PERCOCET  Take 1 tablet by mouth every 4-6 hours as needed for pain. Take stool softener with this medication.     promethazine 25 MG tablet  Commonly known as:  PHENERGAN  Take 1 tablet (25 mg total) by mouth every 6 (six) hours as needed for Nausea.     rosuvastatin 20 MG tablet  Commonly known as:  CRESTOR  Take 20 mg by mouth every evening.     temazepam 30 mg capsule  Commonly known as:  RESTORIL  Take 1 capsule (30 mg total) by mouth nightly as needed.     traMADol 50 mg tablet  Commonly known as:  ULTRAM  Take 1-2 tablets ( mg total) by mouth every 6 (six) hours as needed.            Eloy Flores MD  Orthopedics  [facility]

## 2020-01-09 NOTE — OP NOTE
DATE OF PROCEDURE: 01/09/2020    SURGEON: Rima Covarrubias M.D.    ASSISTANT: RELL Flores MD PGY3  ASSISTANT: SMA Demar      PREOPERATIVE DIAGNOSES:   left  1. Shoulder rotator cuff tear   2. Shoulder biceps tendonitis  3. Shoulder synovitis  4. Shoulder SLAP  5. Shoulder impingement, bursitis    POSTOPERATIVE DIAGNOSES:   left  1. Shoulder rotator cuff tear   2. Shoulder biceps tendonitis  3. Shoulder synovitis  4. Shoulder SLAP  5. Shoulder impingement, bursitis  6. Shoulder adhesions    OPERATION:   left  1. Shoulder arthroscopic rotator cuff repair 35 x 20 mm, large, double row (CPT 80704) (complex, -22 modifier)  2. Shoulder arthroscopic biceps tenodesis (CPT 64863)  3. Shoulder arthroscopic subacromial decompression, bursectomy   4. Shoulder arthroscopic extensive debridement (anterior, posterior glenohumeral joint, subacromial space) (CPT 48183)  5. Shoulder arthroscopic microfracture (Crimson duvet technique) (CPT 52458)  6. Shoulder arthroscopic lysis of adhesions (CPT 18353)    ANESTHESIA:  General with intra-op suprascapular nerve block with local    ESTIMATED BLOOD LOSS:  Minimal.    TOURNIQUET TIME:  None.    DRAINS:  None.    COMPLICATIONS:  None.  The patient was moved to the recovery room in stable condition with compartments soft and cap refill less than a second in all digits.    COMPLEX PROCEDURE:  There was an altered surgical field. There was abnormal anatomy due to the soft bone in the greater tuberosity.   For greater tuberosity and cuff repair 6.5mm was ultimately used x2 instead of 5.5mm due to inadequate fixation and pullout with the 5.5mm anchor (due to the soft bone in the greater tuberosity).  Complexity of the service was much greater than the normative procedure. There was increased time, intensity and technical difficulty of the procedure, severity of the patient's condition and mental effort required. This was a highly complicated repair and tear pattern that required advanced  arthroscopic skill in order to safely and technically perform it.  Nevertheless the repair achieved was excellent.    INDICATIONS/MEDICAL NECESSITY: The patient is a 64 y.o. year-old male who has history and physical examination findings consistent with the above.  Nonoperative versus operative options were discussed.  The risks and benefits were discussed with the patient.  The patient acknowledged understanding and wished to proceed with operative intervention.  Informed consent was obtained prior to the procedure.  Reasonable expectations and potential complications were discussed and acknowledged, including but not limited to infection, bleeding, blood clots, (DVT and/or PE), nerve injury, tear, instability, continued pain and stiffness.  They agreed and understood and wished to proceed.    EXAMINATION UNDER ANESTHESIA of the left SHOULDER: Forward elevation 175 degrees, External rotation at 0 60 degrees, External rotation at 90 90 degrees, Internal rotation at 90 60 degrees.  Translation testing: anterior grade 1, posterior grade 1, sulcus sign grade 1 corrects to 0 on external rotation.    EXAMINATION UNDER ANESTHESIA of the right SHOULDER: Forward elevation 175 degrees, External rotation at 0 60 degrees, External rotation at 90 90 degrees, Internal rotation at 90 60 degrees.  Translation testing: anterior grade 1, posterior grade 1, sulcus sign grade 1 corrects to 0 on external rotation.    PROCEDURE IN DETAIL:  After the correct operative site was marked by the operating surgeon. The patient was then taken to the operating room and placed supine on the operating room table, where the patient  underwent general anesthesia by the anesthesia team.  The patient was then rolled into the lateral decubitus position with the operative side up.  A well-padded axillary roll, beanbag and pillows were placed.  All pressure points were carefully padded and checked.  The upper extremities and both lower extremities were  placed in comfortable positions and were also well-padded.  The operative upper extremity was then prepped and draped in the usual sterile fashion.    Suprascapular nerve block was performed in the standard fashion with 5cc local anesthetic.    The Spider arm positioner was implemented with balanced suspension and appropriate landmarks were noted on the skin.  A posterior followed by myriam-superior portals were created and systematic examination of the joint revealed the following:      There was no evidence of any significant chondral lesions to the glenoid or humeral head.     Tenosynovitis and SLAP type II was noted to the biceps tendon on ramp sign.    There was some synovitis in the labrum that was debrided as needed.  Biceps release with auto-tenodesis was performed using thermal device.  Part of superior labrum was included to allow the biceps tendon to auto-tenodese.  Attention was then turned to the rotator cuff.  The rotator cuff undersurface was then visualized and debrided as needed using a shaver.      Attention was then turned to the subacromial space where a significant hypertrophic bursa was encountered.  The bursa itself was thickened and hypertrophic.  A lateral portal was created to assist with the bursectomy.  Subacromial decompression was completed using three-portal technique in the standard fashion with a 4.5 mm sejal without difficulty.  The anterior osteophyte was flattened.  Confirmation of adequate resection was confirmed while viewing from the lateral portal.      The surface of the rotator cuff was then gently debrided and mobilized.  We did this using a shaver while viewing through the posterior portal and the shaver used through the lateral portal.  We were then able to mobilize the cuff tear which was located at the supraspinatus extending to the infraspinatus.  The cuff was able to be mobilized adequately laterally.  The cuff tear dimensions were: 35 mm AP and 20 mm medial to  lateral.  The undersurface edge of the cuff was debrided as needed using the shaver.  A 7 mm cannula was placed in the lateral and myriam-superior portals.  The bony bed of the greater tuberosity was prepared with the sejal.  This left a nice surface for the articular surface of the cuff to be repaired to and heal to.  Adequate debridement was performed, moving the arm as needed with internal/external rotation.     Shoulder arthroscopic lysis of adhesions (CPT 74556):  With the arthroscope in the subacromial space, an extensive lysis of adhesions needed to be performed with lysis of adhesions in the lateral gutter, posterior gutter, and anterior gutter where there was extensive scarring from the chronic nature of the rotator cuff tear.  The rotator cuff was retracted and there was a large tear noted.  After the lysis of adhesions, the mobility was restored to the rotator cuff tissue and shoulder.    COMPLEX PROCEDURE:  There was an altered surgical field. There was abnormal anatomy due to the soft bone in the greater tuberosity.   For greater tuberosity and cuff repair 6.5mm was ultimately used x2 instead of 5.5mm due to inadequate fixation and pullout with the 5.5mm anchor (due to the soft bone in the greater tuberosity).  Complexity of the service was much greater than the normative procedure. There was increased time, intensity and technical difficulty of the procedure, severity of the patient's condition and mental effort required. This was a highly complicated repair and tear pattern that required advanced arthroscopic skill in order to safely and technically perform it.  Nevertheless the repair achieved was excellent.    A 3 mm incision was made to place the 2 anchors through.  This was done in a central location using internal and external rotation to place the two 5.5mm Ar[employer] Bio-corkscrew anchors; one  anteriorly and one posteriorly.  The anterior anchor was placed first and the posterior anchor was placed  second.  Scorpion suture-passing device was used to place two horizontal mattress sutures through the cuff starting anteriorly and proceeding posteriorly.  Next, the posterior anchor was placed and two more horizontal mattress sutures were passed.  After the two anchors were placed we had four horizontal mattress sutures proceeding from anterior to posterior that were running through the cuff.  The cuff tear was a crescentic shaped tear.  The sutures were then tied using modified Ricky knots proceeding from anterior to posterior.  All knots had good loop and knot security using modified Ricky knots.  After knots were applied from anterior to posterior, the cuff was reapproximated down to the greater tuberosity with good compression and knots on the superior side of the cuff.  The shoulder was cycled through full internal/external rotation.  No suture breakage was noted and the cuff was noted to remain nicely reapproximated throughout the entire rotation of the joint.  The scope was then placed in the joint on the articular side of the cuff.  The cuff was reapproximated nicely.     Prior to the lateral cuff fixation, 'crimson duvet' microfracture technique was performed using an awl to the greater tuberosity in the standard fashion using awl punch device. There was confirmation of marrow elements extravasating out of the end of the microfracture holes upon decrease of pump pressure.     Double row cuff fixation was then performed using a 4.75 x 19.1 mm Swivelock bio-composite anchor x 2.  This gave good compression of the rotator cuff tissue lateral to the medial row down onto the greater tuberosity, which was previously repaired with a bur.      Suprascapular nerve block was performed in the standard fashion with 5cc local anesthetic, and 5cc subacromially for local.  Local was placed about portals and incision(s) after irrigation, as described below, was completed. The shoulder and subacromial space were then  irrigated and fluid was extravasated using suction. All portals were reapproximated using inverted 4-0 Monocryl suture in the subcutaneous tissue of the portals. Mastisol and Steri-strips were placed with xeroform, 4x4s, abd pad, and Medi-pore tape.  TENS unit pads were placed which were medically necessary for pain relief.  An iceman was secured in the shoulder jones.  A sling with an abduction pillow was secured.  The patient was then moved to supine, extubated and taken to the recovery room where the patient arrived in stable condition with the compartments of the arm and forearm soft and cap refill less than a second in all digits.     POSTOPERATIVE PLAN: We will follow the arthroscopic rotator cuff repair guidelines for a large size rotator cuff tear.  We discussed with the patient's family after surgery.  The patient will remain in a sling for 6 weeks.  PT to start at 8 weeks.    Quality of tissue: poor     Quality of the repair: fair      Size of tear: 35 x 20 mm

## 2020-01-09 NOTE — INTERVAL H&P NOTE
The patient has been examined and the H&P has been reviewed:    I concur with the findings and no changes have occurred since H&P was written.    Anesthesia/Surgery risks, benefits and alternative options discussed and understood by patient/family.          Active Hospital Problems    Diagnosis  POA    Traumatic tear of left rotator cuff [S46.012A]  Yes      Resolved Hospital Problems   No resolved problems to display.

## 2020-01-10 ENCOUNTER — TELEPHONE (OUTPATIENT)
Dept: SPORTS MEDICINE | Facility: CLINIC | Age: 65
End: 2020-01-10

## 2020-01-10 NOTE — TELEPHONE ENCOUNTER
----- Message from Uli Yi sent at 1/10/2020 12:05 PM CST -----  Contact: Sheila ( spouse ) @ [phone]  Caller calling to get an excuse for yesterday's visit uploaded My [facility] portal, pls call

## 2020-01-14 ENCOUNTER — TELEPHONE (OUTPATIENT)
Dept: SPORTS MEDICINE | Facility: CLINIC | Age: 65
End: 2020-01-14

## 2020-01-14 NOTE — TELEPHONE ENCOUNTER
Spoke to patient and uploaded his work letter to his portal as well as refaxed it to the number listed below.

## 2020-01-14 NOTE — TELEPHONE ENCOUNTER
----- Message from Marsha Stone MA sent at 1/14/2020  3:57 PM CST -----  Contact: Self/[phone]  Pt stated that he is needing a back to work letter from the start of his surgery to the end and it must state that he was under the care of Dr. Covarrubias. Pt states this is to secure his job Pt is requesting to have the letter either Emailed, Faxed or placed on the portal.     E-mail : [email or web address]      Fax: [phone]    Portal

## 2020-01-24 ENCOUNTER — TELEPHONE (OUTPATIENT)
Dept: SPORTS MEDICINE | Facility: CLINIC | Age: 65
End: 2020-01-24

## 2020-01-24 ENCOUNTER — OFFICE VISIT (OUTPATIENT)
Dept: SPORTS MEDICINE | Facility: CLINIC | Age: 65
End: 2020-01-24
Payer: COMMERCIAL

## 2020-01-24 VITALS
DIASTOLIC BLOOD PRESSURE: 79 MMHG | TEMPERATURE: 98 F | BODY MASS INDEX: 29.41 KG/M2 | SYSTOLIC BLOOD PRESSURE: 130 MMHG | WEIGHT: 166 LBS | HEIGHT: 63 IN | HEART RATE: 87 BPM

## 2020-01-24 DIAGNOSIS — M75.22 BICEPS TENDINITIS OF LEFT UPPER EXTREMITY: ICD-10-CM

## 2020-01-24 DIAGNOSIS — G89.18 POST-OPERATIVE PAIN: ICD-10-CM

## 2020-01-24 DIAGNOSIS — M25.512 LEFT SHOULDER PAIN, UNSPECIFIED CHRONICITY: ICD-10-CM

## 2020-01-24 DIAGNOSIS — S46.012A TRAUMATIC TEAR OF LEFT ROTATOR CUFF, UNSPECIFIED TEAR EXTENT, INITIAL ENCOUNTER: ICD-10-CM

## 2020-01-24 DIAGNOSIS — S43.432A SUPERIOR GLENOID LABRUM LESION OF LEFT SHOULDER, INITIAL ENCOUNTER: ICD-10-CM

## 2020-01-24 PROCEDURE — 99024 POSTOP FOLLOW-UP VISIT: CPT | Mod: S$GLB,,, | Performed by: PHYSICIAN ASSISTANT

## 2020-01-24 PROCEDURE — 99024 PR POST-OP FOLLOW-UP VISIT: ICD-10-PCS | Mod: S$GLB,,, | Performed by: PHYSICIAN ASSISTANT

## 2020-01-24 PROCEDURE — 99999 PR PBB SHADOW E&M-EST. PATIENT-LVL III: ICD-10-PCS | Mod: PBBFAC,,, | Performed by: PHYSICIAN ASSISTANT

## 2020-01-24 PROCEDURE — 99999 PR PBB SHADOW E&M-EST. PATIENT-LVL III: CPT | Mod: PBBFAC,,, | Performed by: PHYSICIAN ASSISTANT

## 2020-01-24 RX ORDER — OXYCODONE AND ACETAMINOPHEN 10; 325 MG/1; MG/1
TABLET ORAL
Qty: 21 TABLET | Refills: 0 | Status: SHIPPED | OUTPATIENT
Start: 2020-01-24 | End: 2020-05-22

## 2020-01-24 NOTE — TELEPHONE ENCOUNTER
Called patient to remind him to pus PT appointment back 6 weeks, Provided patient with central  Scheduling number , LVM.

## 2020-01-27 NOTE — PROGRESS NOTES
Chief Complaint: 2 week shoulder surgery f/u    HISTORY OF PRESENT ILLNESS:   Pt is here today for first post-operative followup of shoulder arthroscopy.  he is doing well.  We have reviewed patient's findings and discussed plan of care and future treatment options.      Tolerating pain well. Still having some pain at night that interferes with his sleep.   Wearing sling which is in place.    DATE OF PROCEDURE: 01/09/2020     SURGEON: Rima Covarrubias M.D.     OPERATION:   left  1. Shoulder arthroscopic rotator cuff repair 35 x 20 mm, large, double row (CPT 65727) (complex, -22 modifier)  2. Shoulder arthroscopic biceps tenodesis (CPT 24712)  3. Shoulder arthroscopic subacromial decompression, bursectomy   4. Shoulder arthroscopic extensive debridement (anterior, posterior glenohumeral joint, subacromial space) (CPT 69883)  5. Shoulder arthroscopic microfracture (Crimson duvet technique) (CPT 03162)  6. Shoulder arthroscopic lysis of adhesions (CPT 98752)                                                                                  PHYSICAL EXAMINATION:     Incision sites healed well  No evidence of any erythema, infection or induration  elbow Range of motion full   Minimal effusion  2+ Radial pulses  No swelling                                                                               ASSESSMENT:                                                                                                                                               1. Status post above, doing well.                                                                                                                               PLAN:                                                                                                                                                     1. PT to start in a few weeks. Continue weaning narcotics.   Percocet refilled.   2. Emphasized scapular function.  3. I have discussed return to activity in detail.  4.  Patient will see us back in 4 weeks.                                      5. All questions were answered and the patient should contact us if he  has any questions or concerns in the interim.

## 2020-01-31 ENCOUNTER — TELEPHONE (OUTPATIENT)
Dept: SPORTS MEDICINE | Facility: CLINIC | Age: 65
End: 2020-01-31

## 2020-01-31 NOTE — TELEPHONE ENCOUNTER
Spoke to patient and let him know his paperwork will be completed by Monday and ready for him to .

## 2020-01-31 NOTE — TELEPHONE ENCOUNTER
----- Message from Chaya Wesley sent at 1/31/2020 11:41 AM CST -----  Contact: pt called   Pt is calling to see if papers were filled out that he  Dropped off into the office earlier this week,  Pt contact is [phone]

## 2020-02-03 ENCOUNTER — TELEPHONE (OUTPATIENT)
Dept: SPORTS MEDICINE | Facility: CLINIC | Age: 65
End: 2020-02-03

## 2020-02-03 NOTE — TELEPHONE ENCOUNTER
Received FMLA/STD forms from  [employer]. Forms completed and faxed to them at [phone].    Patient was notified forms completed and faxed. He would like to pick the originals up. They were placed at the  for him.    Marta Wallis MA  [facility]

## 2020-02-20 ENCOUNTER — TELEPHONE (OUTPATIENT)
Dept: SPORTS MEDICINE | Facility: CLINIC | Age: 65
End: 2020-02-20

## 2020-02-26 ENCOUNTER — OFFICE VISIT (OUTPATIENT)
Dept: SPORTS MEDICINE | Facility: CLINIC | Age: 65
End: 2020-02-26
Payer: COMMERCIAL

## 2020-02-26 VITALS — DIASTOLIC BLOOD PRESSURE: 74 MMHG | SYSTOLIC BLOOD PRESSURE: 122 MMHG | HEART RATE: 99 BPM

## 2020-02-26 DIAGNOSIS — Z98.890 S/P ROTATOR CUFF SURGERY: Primary | ICD-10-CM

## 2020-02-26 DIAGNOSIS — S46.012A TRAUMATIC TEAR OF LEFT ROTATOR CUFF, UNSPECIFIED TEAR EXTENT, INITIAL ENCOUNTER: ICD-10-CM

## 2020-02-26 DIAGNOSIS — S43.432A SUPERIOR GLENOID LABRUM LESION OF LEFT SHOULDER, INITIAL ENCOUNTER: ICD-10-CM

## 2020-02-26 DIAGNOSIS — M75.22 BICEPS TENDINITIS OF LEFT UPPER EXTREMITY: ICD-10-CM

## 2020-02-26 PROCEDURE — 99024 POSTOP FOLLOW-UP VISIT: CPT | Mod: S$GLB,,, | Performed by: PHYSICIAN ASSISTANT

## 2020-02-26 PROCEDURE — 99999 PR PBB SHADOW E&M-EST. PATIENT-LVL III: ICD-10-PCS | Mod: PBBFAC,,, | Performed by: PHYSICIAN ASSISTANT

## 2020-02-26 PROCEDURE — 99999 PR PBB SHADOW E&M-EST. PATIENT-LVL III: CPT | Mod: PBBFAC,,, | Performed by: PHYSICIAN ASSISTANT

## 2020-02-26 PROCEDURE — 99024 PR POST-OP FOLLOW-UP VISIT: ICD-10-PCS | Mod: S$GLB,,, | Performed by: PHYSICIAN ASSISTANT

## 2020-02-26 NOTE — PROGRESS NOTES
Chief Complaint: 6 week shoulder surgery f/u    HISTORY OF PRESENT ILLNESS:   Pt is here today for post-operative followup of shoulder arthroscopy.  he is doing well.  We have reviewed patient's findings and discussed plan of care and future treatment options.      He reports no pain. Nightly discomfort has resolved.   Wearing sling which is in place.      DATE OF PROCEDURE: 01/09/2020     SURGEON: Rima Covarrubias M.D.     OPERATION:   left  1. Shoulder arthroscopic rotator cuff repair 35 x 20 mm, large, double row (CPT 08057) (complex, -22 modifier)  2. Shoulder arthroscopic biceps tenodesis (CPT 77035)  3. Shoulder arthroscopic subacromial decompression, bursectomy   4. Shoulder arthroscopic extensive debridement (anterior, posterior glenohumeral joint, subacromial space) (CPT 18298)  5. Shoulder arthroscopic microfracture (Crimson duvet technique) (CPT 13503)  6. Shoulder arthroscopic lysis of adhesions (CPT 65673)                                                                                  PHYSICAL EXAMINATION:     Incision sites healed well  No evidence of any erythema, infection or induration  elbow Range of motion full   Minimal effusion  2+ Radial pulses  No swelling                                                                               ASSESSMENT:                                                                                                                                               1. Status post above, doing well.                                                                                                                               PLAN:                                                                                                                                                     1. PT to start in 1 week here.    Continue sling for 1 week and then only as needed. Can D/c pillow today.   2. Emphasized scapular function.  3. I have discussed return to activity in detail.  4. Patient will  see us back in 6 weeks.                                      5. All questions were answered and the patient should contact us if he  has any questions or concerns in the interim.        Will continue his FMLA leave for another 6 weeks. Patient states he can go back to light duty at that time.

## 2020-03-02 ENCOUNTER — TELEPHONE (OUTPATIENT)
Dept: SPORTS MEDICINE | Facility: CLINIC | Age: 65
End: 2020-03-02

## 2020-03-02 NOTE — TELEPHONE ENCOUNTER
Received Provider Statement forms from  [employer]. Forms completed and faxed to them at [phone].

## 2020-03-02 NOTE — TELEPHONE ENCOUNTER
Left message letting patient know as soon as Dr Covarrubias signes off on his paperwork we will call him to pick it up.

## 2020-03-02 NOTE — TELEPHONE ENCOUNTER
Notified patient that form was complete. He will pick it up from us. Form placed at the  for him.    Marta Wallis MA  [facility]

## 2020-03-02 NOTE — TELEPHONE ENCOUNTER
----- Message from Adenike Toth sent at 3/2/2020 10:19 AM CST -----  Pt is calling to see if his paper work was ready to be  and would like for the nurse to give him a call back

## 2020-03-03 ENCOUNTER — CLINICAL SUPPORT (OUTPATIENT)
Dept: REHABILITATION | Facility: HOSPITAL | Age: 65
End: 2020-03-03
Payer: COMMERCIAL

## 2020-03-03 DIAGNOSIS — M25.512 ACUTE PAIN OF LEFT SHOULDER: ICD-10-CM

## 2020-03-03 DIAGNOSIS — R53.1 WEAKNESS: ICD-10-CM

## 2020-03-03 DIAGNOSIS — S43.432A SUPERIOR GLENOID LABRUM LESION OF LEFT SHOULDER, INITIAL ENCOUNTER: ICD-10-CM

## 2020-03-03 DIAGNOSIS — Z98.890 S/P ROTATOR CUFF SURGERY: ICD-10-CM

## 2020-03-03 DIAGNOSIS — M75.22 BICEPS TENDINITIS OF LEFT UPPER EXTREMITY: ICD-10-CM

## 2020-03-03 DIAGNOSIS — S46.012A TRAUMATIC TEAR OF LEFT ROTATOR CUFF, UNSPECIFIED TEAR EXTENT, INITIAL ENCOUNTER: ICD-10-CM

## 2020-03-03 PROCEDURE — 97161 PT EVAL LOW COMPLEX 20 MIN: CPT | Performed by: PHYSICAL THERAPIST

## 2020-03-03 PROCEDURE — 97140 MANUAL THERAPY 1/> REGIONS: CPT | Performed by: PHYSICAL THERAPIST

## 2020-03-03 PROCEDURE — 97110 THERAPEUTIC EXERCISES: CPT | Performed by: PHYSICAL THERAPIST

## 2020-03-03 NOTE — PROGRESS NOTES
[facility]  Physical Therapy Initial Evaluation    Name: Chad Pringle  Clinic Number: [id number]    Therapy Diagnosis: No diagnosis found.  Physician: Keron Aguila III, *    Physician Orders: PT Eval and Treat RTC Repair, large tear  Medical Diagnosis from Referral:   S46.012A (ICD-10-CM) - Traumatic tear of left rotator cuff, unspecified tear extent, initial encounter   M75.22 (ICD-10-CM) - Biceps tendinitis of left upper extremity   S43.432A (ICD-10-CM) - Superior glenoid labrum lesion of left shoulder, initial encounter   Z98.890 (ICD-10-CM) - S/P rotator cuff surgery       Evaluation Date: 3/3/2020  Authorization Period Expiration: 2-25-21  Plan of Care Expiration: 9-1-20  Visit # / Visits authorized: 1/ 1    Time In: 0900  Time Out: 1000  Total Billable Time: 60 minutes    Precautions: Standard    Subjective   Date of onset: surgery on 1-9-20  History of current condition - Chad reports: pt reports falling on L shoulder in September of last year. He was referred to Dr. Covarrubias and had surgery on the above date. Pt reports no complications, he is 7 weeks post op.  Pt has F/U in 4 weeks.     Medical History:   Past Medical History:   Diagnosis Date    High cholesterol     Hypertension     Hypertrophy of prostate without urinary obstruction and other lower urinary tract symptoms (LUTS)     Nocturia     Type II or unspecified type diabetes mellitus without mention of complication, not stated as uncontrolled     Unspecified disorder of male genital organs        Surgical History:   Chad Pringle  has a past surgical history that includes Knee surgery; Arthroscopic repair of rotator cuff of shoulder (Left, 1/9/2020); Arthroscopic debridement of shoulder (Left, 1/9/2020); Excision of bursa (Left, 1/9/2020); and Arthroscopy of shoulder with decompression of subacromial space (Left, 1/9/2020).    Medications:   Chad has a current medication list which includes the following  prescription(s): alprazolam, aspirin, dapagliflozin, gabapentin, janumet xr, lisinopril, nitroglycerin, oxycodone-acetaminophen, promethazine, and rosuvastatin.    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 He has not used his shoulder significantly.    Pain:  Current 4/10, worst 8/10, best 2/10   Location: left shoulder   Description: Aching, Dull and Throbbing  Aggravating Factors: Laying  Easing Factors: rest    Pts goals: return to 100%    Objective     Observation: incision sites are healed. Pt presents in clinic wearing sling with no abd pillow.    Posture: RS posture      Passive Range of Motion (*= pain):   Shoulder Right Left   Flexion 180 135   Abduction 180 100   ER at 0 60 40   ER at 90 95 40   IR 60 30      Active Range of Motion(*= pain):   Shoulder Right Left   Flexion 175 nt17   Abduction 175 nt   ER at 0 60 nt   ER at 90 90 nt   IR 60 nt          Strength (*= pain):  R shoulder is 4+/5 grossly  L shoulder not tested at this time      Scapular Control/Dyskinesis:     Normal / Subtle / Obvious  Comments    Left  obvious n/a   Right  normal n/a       Palpation: tenderness infraspinatus muscle belly LUE    Proximal/distal screen:   Cervical: mild limitation in R SB and L rotation    Sensation: intact LUE      TREATMENT   Treatment Time In: 0900  Treatment Time Out: 1000  Total Treatment time separate from Evaluation: 40 minutes    Chad received therapeutic exercises to develop ROM, flexibility and posture for 25 minutes including:  Reviewed and performed hep  Scapular rolls  Protraction retraction  Table slides scaption  Table ER  Wall slides    Chad received the following manual therapy  techniques: Joint mobilizations were applied to the: L shoulder for 15 minutes, including:  Long axis traction, inferior glides, prom to tolerance all ranges      Chad received hot pack for 10 minutes to L shoulder for pain.      Home Exercises and Patient Education Provided    Education provided:   - demo and reviewed hep with pt    Written Home Exercises Provided: yes.  Exercises were reviewed and Chad was able to demonstrate them prior to the end of the session.  Chad demonstrated good  understanding of the education provided.     See EMR under Patient Instructions for exercises provided 3/3/2020.    Assessment   Chad is a 65 y.o. male referred to outpatient Physical Therapy with a medical diagnosis of L RTC repair, bicep tenodesis, SAD. Pt presents with decreased ROM, joint stiffness, weakness, pain, decreased functional mobility secondary to the above dx. Pt would benefit from skilled PT in order to maximize function.    Pt prognosis is Good.   Pt will benefit from skilled outpatient Physical Therapy to address the deficits stated above and in the chart below, provide pt/family education, and to maximize pt's level of independence.     Plan of care discussed with patient: Yes  Pt's spiritual, cultural and educational needs considered and patient is agreeable to the plan of care and goals as stated below:     Anticipated Barriers for therapy: Large tear    Medical Necessity is demonstrated by the following  History  Co-morbidities and personal factors that may impact the plan of care Co-morbidities:   advanced age    Personal Factors:   no deficits     low   Examination  Body Structures and Functions, activity limitations and participation restrictions that may impact the plan of care Body Regions:   upper extremities    Body Systems:    ROM  strength  gross coordinated movement  motor control    Participation Restrictions:   Limited per protocol    Activity limitations:   Learning and applying knowledge  no  deficits    General Tasks and Commands  no deficits    Communication  no deficits    Mobility  lifting and carrying objects  using transportation (bus, train, plane, car)    Self care  washing oneself (bathing, drying, washing hands)  caring for body parts (brushing teeth, shaving, grooming)  toileting    Domestic Life  doing house work (cleaning house, washing dishes, laundry)  assisting others    Interactions/Relationships  no deficits    Life Areas  no deficits    Community and Social Life  no deficits         low   Clinical Presentation stable and uncomplicated low   Decision Making/ Complexity Score: low     GOALS: Short Term Goals:  8 weeks  1.Report decreased L shoulder pain < / =  3/10  to increase tolerance for daily activities  2. Increase PROM full in all ranges, AROM 50% or better   3. Increased strength by 1/3 MMT grade in LUE to increase tolerance for ADL and work activities.  4. Pt to tolerate HEP to improve ROM and independence with ADL's  5. Pt reports subjective improvement by 50%    Long Term Goals: 16 weeks  1.Report decreased L shoulder pain  < / =  1 /10  to increase tolerance for daily and work activites  2.Increase AROM to full and equal to RUE  3.Increase strength to >/= 4/5 in RUE to increase tolerance for ADL and work activities.  4. Pt goal: return to work without restriction  5. Pt will have subjective improvement of 85% or better      Plan   Plan of care Certification: 3/3/2020 to 9-1-20.    Outpatient Physical Therapy 1-2 times weekly for 16 weeks to include the following interventions: Moist Heat/ Ice, Neuromuscular Re-ed, Patient Education, Self Care, Therapeutic Activites and Therapeutic Exercise.     Wilver Higgins, PT

## 2020-03-03 NOTE — PLAN OF CARE
[facility]  Physical Therapy Initial Evaluation    Name: Chad Pringle  Clinic Number: [id number]    Therapy Diagnosis: No diagnosis found.  Physician: Keron Aguila III, *    Physician Orders: PT Eval and Treat RTC Repair, large tear  Medical Diagnosis from Referral:   S46.012A (ICD-10-CM) - Traumatic tear of left rotator cuff, unspecified tear extent, initial encounter   M75.22 (ICD-10-CM) - Biceps tendinitis of left upper extremity   S43.432A (ICD-10-CM) - Superior glenoid labrum lesion of left shoulder, initial encounter   Z98.890 (ICD-10-CM) - S/P rotator cuff surgery       Evaluation Date: 3/3/2020  Authorization Period Expiration: 2-25-21  Plan of Care Expiration: 9-1-20  Visit # / Visits authorized: 1/ 1    Time In: 0900  Time Out: 1000  Total Billable Time: 60 minutes    Precautions: Standard    Subjective   Date of onset: surgery on 1-9-20  History of current condition - Chad reports: pt reports falling on L shoulder in September of last year. He was referred to Dr. Covarrubias and had surgery on the above date. Pt reports no complications, he is 7 weeks post op.  Pt has F/U in 4 weeks.     Medical History:   Past Medical History:   Diagnosis Date    High cholesterol     Hypertension     Hypertrophy of prostate without urinary obstruction and other lower urinary tract symptoms (LUTS)     Nocturia     Type II or unspecified type diabetes mellitus without mention of complication, not stated as uncontrolled     Unspecified disorder of male genital organs        Surgical History:   Chad Pringle  has a past surgical history that includes Knee surgery; Arthroscopic repair of rotator cuff of shoulder (Left, 1/9/2020); Arthroscopic debridement of shoulder (Left, 1/9/2020); Excision of bursa (Left, 1/9/2020); and Arthroscopy of shoulder with decompression of subacromial space (Left, 1/9/2020).    Medications:   Chad has a current medication list which includes the following  prescription(s): alprazolam, aspirin, dapagliflozin, gabapentin, janumet xr, lisinopril, nitroglycerin, oxycodone-acetaminophen, promethazine, and rosuvastatin.    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 He has not used his shoulder significantly.    Pain:  Current 4/10, worst 8/10, best 2/10   Location: left shoulder   Description: Aching, Dull and Throbbing  Aggravating Factors: Laying  Easing Factors: rest    Pts goals: return to 100%    Objective     Observation: incision sites are healed. Pt presents in clinic wearing sling with no abd pillow.    Posture: RS posture      Passive Range of Motion (*= pain):   Shoulder Right Left   Flexion 180 135   Abduction 180 100   ER at 0 60 40   ER at 90 95 40   IR 60 30      Active Range of Motion(*= pain):   Shoulder Right Left   Flexion 175 nt17   Abduction 175 nt   ER at 0 60 nt   ER at 90 90 nt   IR 60 nt          Strength (*= pain):  R shoulder is 4+/5 grossly  L shoulder not tested at this time      Scapular Control/Dyskinesis:     Normal / Subtle / Obvious  Comments    Left  obvious n/a   Right  normal n/a       Palpation: tenderness infraspinatus muscle belly LUE    Proximal/distal screen:   Cervical: mild limitation in R SB and L rotation    Sensation: intact LUE      TREATMENT   Treatment Time In: 0900  Treatment Time Out: 1000  Total Treatment time separate from Evaluation: 40 minutes    Chad received therapeutic exercises to develop ROM, flexibility and posture for 25 minutes including:  Reviewed and performed hep  Scapular rolls  Protraction retraction  Table slides scaption  Table ER  Wall slides    Chad received the following manual therapy  techniques: Joint mobilizations were applied to the: L shoulder for 15 minutes, including:  Long axis traction, inferior glides, prom to tolerance all ranges      Chad received hot pack for 10 minutes to L shoulder for pain.      Home Exercises and Patient Education Provided    Education provided:   - demo and reviewed hep with pt    Written Home Exercises Provided: yes.  Exercises were reviewed and Chad was able to demonstrate them prior to the end of the session.  Chad demonstrated good  understanding of the education provided.     See EMR under Patient Instructions for exercises provided 3/3/2020.    Assessment   Chad is a 65 y.o. male referred to outpatient Physical Therapy with a medical diagnosis of L RTC repair, bicep tenodesis, SAD. Pt presents with decreased ROM, joint stiffness, weakness, pain, decreased functional mobility secondary to the above dx. Pt would benefit from skilled PT in order to maximize function.    Pt prognosis is Good.   Pt will benefit from skilled outpatient Physical Therapy to address the deficits stated above and in the chart below, provide pt/family education, and to maximize pt's level of independence.     Plan of care discussed with patient: Yes  Pt's spiritual, cultural and educational needs considered and patient is agreeable to the plan of care and goals as stated below:     Anticipated Barriers for therapy: Large tear    Medical Necessity is demonstrated by the following  History  Co-morbidities and personal factors that may impact the plan of care Co-morbidities:   advanced age    Personal Factors:   no deficits     low   Examination  Body Structures and Functions, activity limitations and participation restrictions that may impact the plan of care Body Regions:   upper extremities    Body Systems:    ROM  strength  gross coordinated movement  motor control    Participation Restrictions:   Limited per protocol    Activity limitations:   Learning and applying knowledge  no  deficits    General Tasks and Commands  no deficits    Communication  no deficits    Mobility  lifting and carrying objects  using transportation (bus, train, plane, car)    Self care  washing oneself (bathing, drying, washing hands)  caring for body parts (brushing teeth, shaving, grooming)  toileting    Domestic Life  doing house work (cleaning house, washing dishes, laundry)  assisting others    Interactions/Relationships  no deficits    Life Areas  no deficits    Community and Social Life  no deficits         low   Clinical Presentation stable and uncomplicated low   Decision Making/ Complexity Score: low     GOALS: Short Term Goals:  8 weeks  1.Report decreased L shoulder pain < / =  3/10  to increase tolerance for daily activities  2. Increase PROM full in all ranges, AROM 50% or better   3. Increased strength by 1/3 MMT grade in LUE to increase tolerance for ADL and work activities.  4. Pt to tolerate HEP to improve ROM and independence with ADL's  5. Pt reports subjective improvement by 50%    Long Term Goals: 16 weeks  1.Report decreased L shoulder pain  < / =  1 /10  to increase tolerance for daily and work activites  2.Increase AROM to full and equal to RUE  3.Increase strength to >/= 4/5 in RUE to increase tolerance for ADL and work activities.  4. Pt goal: return to work without restriction  5. Pt will have subjective improvement of 85% or better      Plan   Plan of care Certification: 3/3/2020 to 9-1-20.    Outpatient Physical Therapy 1-2 times weekly for 16 weeks to include the following interventions: Moist Heat/ Ice, Neuromuscular Re-ed, Patient Education, Self Care, Therapeutic Activites and Therapeutic Exercise.     Wilver Higgins, PT

## 2020-03-06 ENCOUNTER — CLINICAL SUPPORT (OUTPATIENT)
Dept: REHABILITATION | Facility: HOSPITAL | Age: 65
End: 2020-03-06
Payer: COMMERCIAL

## 2020-03-06 DIAGNOSIS — M25.512 ACUTE PAIN OF LEFT SHOULDER: ICD-10-CM

## 2020-03-06 DIAGNOSIS — R53.1 WEAKNESS: ICD-10-CM

## 2020-03-06 PROCEDURE — 97140 MANUAL THERAPY 1/> REGIONS: CPT | Performed by: PHYSICAL THERAPIST

## 2020-03-06 PROCEDURE — 97110 THERAPEUTIC EXERCISES: CPT | Performed by: PHYSICAL THERAPIST

## 2020-03-06 NOTE — PROGRESS NOTES
Physical Therapy Daily Treatment Note     Name: Chad Pringle  Clinic Number: [id number]    Therapy Diagnosis:   Encounter Diagnoses   Name Primary?    Acute pain of left shoulder     Weakness      Physician: Keron Aguila III, *    Visit Date: 3/6/2020    Physician Orders: PT Eval and Treat RTC Repair, large tear  Medical Diagnosis from Referral:   S46.012A (ICD-10-CM) - Traumatic tear of left rotator cuff, unspecified tear extent, initial encounter   M75.22 (ICD-10-CM) - Biceps tendinitis of left upper extremity   S43.432A (ICD-10-CM) - Superior glenoid labrum lesion of left shoulder, initial encounter   Z98.890 (ICD-10-CM) - S/P rotator cuff surgery         Time In: 1300  Time Out: 1345  Total Billable Time: 38 minutes    Precautions: Standard    Subjective     Pt reports: states that he is weaning out of sling. Has some increase in pain.  He was compliant with home exercise program.  Response to previous treatment: improved ROM  Functional change: improved ROM    Pain: 5/10  Location: left shoulder      Objective     Chad received therapeutic exercises to develop strength, endurance, ROM and flexibility for 28 minutes including:  Shoulder rolls  scap up down  scap f/b  Pulley flexion and scaption  Prone row  AAROM flexion  AAROM ABD  AAROM ER with wand    Chad received the following manual therapy techniques: Joint mobilizations and Manual traction were applied to the: L shoulder for 10 minutes, including:  Long axis traction, prom, inferior mobs      Home Exercises Provided and Patient Education Provided     Education provided:   - reviewed hep    Written Home Exercises Provided: Patient instructed to cont prior HEP.  Exercises were reviewed and Chad was able to demonstrate them prior to the end of the session.  Chad demonstrated good  understanding of the education provided.     See EMR under Patient Instructions for exercises provided prior visit.    Assessment     ROM progressing well. Does have pain  at all end ranges. Instructed to cont HEP.   Chad is progressing well towards his goals.   Pt prognosis is Good.     Pt will continue to benefit from skilled outpatient physical therapy to address the deficits listed in the problem list box on initial evaluation, provide pt/family education and to maximize pt's level of independence in the home and community environment.     Pt's spiritual, cultural and educational needs considered and pt agreeable to plan of care and goals.     Anticipated barriers to physical therapy: none    GOALS: Short Term Goals:  8 weeks  1.Report decreased L shoulder pain < / =  3/10  to increase tolerance for daily activities  2. Increase PROM full in all ranges, AROM 50% or better   3. Increased strength by 1/3 MMT grade in LUE to increase tolerance for ADL and work activities.  4. Pt to tolerate HEP to improve ROM and independence with ADL's  5. Pt reports subjective improvement by 50%     Long Term Goals: 16 weeks  1.Report decreased L shoulder pain  < / =  1 /10  to increase tolerance for daily and work activites  2.Increase AROM to full and equal to RUE  3.Increase strength to >/= 4/5 in RUE to increase tolerance for ADL and work activities.  4. Pt goal: return to work without restriction  5. Pt will have subjective improvement of 85% or better       Plan     Cont per poc, increase as tolerated    Wilver Higgins, PT

## 2020-03-10 ENCOUNTER — CLINICAL SUPPORT (OUTPATIENT)
Dept: REHABILITATION | Facility: HOSPITAL | Age: 65
End: 2020-03-10
Payer: COMMERCIAL

## 2020-03-10 DIAGNOSIS — R53.1 WEAKNESS: ICD-10-CM

## 2020-03-10 DIAGNOSIS — M25.512 ACUTE PAIN OF LEFT SHOULDER: ICD-10-CM

## 2020-03-10 PROCEDURE — 97140 MANUAL THERAPY 1/> REGIONS: CPT | Performed by: PHYSICAL THERAPIST

## 2020-03-10 PROCEDURE — 97110 THERAPEUTIC EXERCISES: CPT | Performed by: PHYSICAL THERAPIST

## 2020-03-10 NOTE — PROGRESS NOTES
Physical Therapy Daily Treatment Note     Name: Chad Pringle  Clinic Number: [id number]    Therapy Diagnosis:   Encounter Diagnoses   Name Primary?    Acute pain of left shoulder     Weakness      Physician: Keron Aguila III, *    Visit Date: 3/10/2020    Physician Orders: PT Eval and Treat RTC Repair, large tear  Medical Diagnosis from Referral:   S46.012A (ICD-10-CM) - Traumatic tear of left rotator cuff, unspecified tear extent, initial encounter   M75.22 (ICD-10-CM) - Biceps tendinitis of left upper extremity   S43.432A (ICD-10-CM) - Superior glenoid labrum lesion of left shoulder, initial encounter   Z98.890 (ICD-10-CM) - S/P rotator cuff surgery         Time In:0700  Time Out: 0800  Total Billable Time: 40 minutes    Precautions: Standard    Subjective     Pt reports: pt states that shoulder is sore but overall doing well.  He was compliant with home exercise program.  Response to previous treatment: improved ROM  Functional change: improved ROM    Pain: 6/10  Location: left shoulder      Objective     Chad received therapeutic exercises to develop strength, endurance, ROM and flexibility for 30 minutes including:  Shoulder rolls  scap up down  scap f/b  Pulley flexion and scaption  Prone row  AAROM flexion wand  AAROM ABD  AAROM ER with wand  Finger ladder    Chad received the following manual therapy techniques: Joint mobilizations and Manual traction were applied to the: L shoulder for 8 minutes, including:  Long axis traction, prom, inferior mobs      Home Exercises Provided and Patient Education Provided     Education provided:   - reviewed hep    Written Home Exercises Provided: Patient instructed to cont prior HEP.  Exercises were reviewed and Chad was able to demonstrate them prior to the end of the session.  Chad demonstrated good  understanding of the education provided.     See EMR under Patient Instructions for exercises provided prior visit.    Assessment     ROM Is progressing well.  Good understanding of hep.  Chad is progressing well towards his goals.   Pt prognosis is Good.     Pt will continue to benefit from skilled outpatient physical therapy to address the deficits listed in the problem list box on initial evaluation, provide pt/family education and to maximize pt's level of independence in the home and community environment.     Pt's spiritual, cultural and educational needs considered and pt agreeable to plan of care and goals.     Anticipated barriers to physical therapy: none    GOALS: Short Term Goals:  8 weeks  1.Report decreased L shoulder pain < / =  3/10  to increase tolerance for daily activities  2. Increase PROM full in all ranges, AROM 50% or better   3. Increased strength by 1/3 MMT grade in LUE to increase tolerance for ADL and work activities.  4. Pt to tolerate HEP to improve ROM and independence with ADL's  5. Pt reports subjective improvement by 50%     Long Term Goals: 16 weeks  1.Report decreased L shoulder pain  < / =  1 /10  to increase tolerance for daily and work activites  2.Increase AROM to full and equal to RUE  3.Increase strength to >/= 4/5 in RUE to increase tolerance for ADL and work activities.  4. Pt goal: return to work without restriction  5. Pt will have subjective improvement of 85% or better       Plan     Cont per poc, increase as tolerated    Wilver Higgins, PT

## 2020-03-12 ENCOUNTER — CLINICAL SUPPORT (OUTPATIENT)
Dept: REHABILITATION | Facility: HOSPITAL | Age: 65
End: 2020-03-12
Payer: COMMERCIAL

## 2020-03-12 DIAGNOSIS — R53.1 WEAKNESS: ICD-10-CM

## 2020-03-12 DIAGNOSIS — M25.512 ACUTE PAIN OF LEFT SHOULDER: ICD-10-CM

## 2020-03-12 PROCEDURE — 97140 MANUAL THERAPY 1/> REGIONS: CPT | Performed by: PHYSICAL THERAPIST

## 2020-03-12 PROCEDURE — 97110 THERAPEUTIC EXERCISES: CPT | Performed by: PHYSICAL THERAPIST

## 2020-03-12 NOTE — PROGRESS NOTES
Physical Therapy Daily Treatment Note     Name: Chad Pringle  Clinic Number: [id number]    Therapy Diagnosis:   No diagnosis found.  Physician: Keron Aguila III, *    Visit Date: 3/12/2020    Physician Orders: PT Eval and Treat RTC Repair, large tear  Medical Diagnosis from Referral:   S46.012A (ICD-10-CM) - Traumatic tear of left rotator cuff, unspecified tear extent, initial encounter   M75.22 (ICD-10-CM) - Biceps tendinitis of left upper extremity   S43.432A (ICD-10-CM) - Superior glenoid labrum lesion of left shoulder, initial encounter   Z98.890 (ICD-10-CM) - S/P rotator cuff surgery         Time In:0700  Time Out: 0800  Total Billable Time: 60 minutes    Precautions: Standard    Subjective     Pt reports: pt states shoulder is a little sore. Performing hep regularly  He was compliant with home exercise program.  Response to previous treatment: improved ROM  Functional change: improved ROM    Pain: 5/10  Location: left shoulder      Objective     Chad received therapeutic exercises to develop strength, endurance, ROM and flexibility for 38 minutes including:  Shoulder rolls  scap up down  scap f/b  Pulley flexion and scaption  Prone row  AAROM chest press  AAROM ABD  AAROM ER with wand  Prone row  Wall slide flexion  Wall slide with 45 degree lowering      Chad received the following manual therapy techniques: Joint mobilizations and Manual traction were applied to the: L shoulder for 15 minutes, including:  Long axis traction, prom, inferior mobs      Home Exercises Provided and Patient Education Provided     Education provided:   - reviewed hep    Written Home Exercises Provided: Patient instructed to cont prior HEP.  Exercises were reviewed and Chad was able to demonstrate them prior to the end of the session.  Chad demonstrated good  understanding of the education provided.     See EMR under Patient Instructions for exercises provided prior visit.    Assessment     Improving ROM in all ranges ,  shows good understanding of new hep  Chad is progressing well towards his goals.   Pt prognosis is Good.     Pt will continue to benefit from skilled outpatient physical therapy to address the deficits listed in the problem list box on initial evaluation, provide pt/family education and to maximize pt's level of independence in the home and community environment.     Pt's spiritual, cultural and educational needs considered and pt agreeable to plan of care and goals.     Anticipated barriers to physical therapy: none    GOALS: Short Term Goals:  8 weeks  1.Report decreased L shoulder pain < / =  3/10  to increase tolerance for daily activities  2. Increase PROM full in all ranges, AROM 50% or better   3. Increased strength by 1/3 MMT grade in LUE to increase tolerance for ADL and work activities.  4. Pt to tolerate HEP to improve ROM and independence with ADL's  5. Pt reports subjective improvement by 50%     Long Term Goals: 16 weeks  1.Report decreased L shoulder pain  < / =  1 /10  to increase tolerance for daily and work activites  2.Increase AROM to full and equal to RUE  3.Increase strength to >/= 4/5 in RUE to increase tolerance for ADL and work activities.  4. Pt goal: return to work without restriction  5. Pt will have subjective improvement of 85% or better       Plan     Cont per poc, increase as tolerated    Wilver Higgins, PT

## 2020-03-17 ENCOUNTER — CLINICAL SUPPORT (OUTPATIENT)
Dept: REHABILITATION | Facility: HOSPITAL | Age: 65
End: 2020-03-17
Attending: ORTHOPAEDIC SURGERY
Payer: COMMERCIAL

## 2020-03-17 DIAGNOSIS — M25.512 ACUTE PAIN OF LEFT SHOULDER: ICD-10-CM

## 2020-03-17 DIAGNOSIS — R53.1 WEAKNESS: ICD-10-CM

## 2020-03-17 PROCEDURE — 97110 THERAPEUTIC EXERCISES: CPT | Performed by: PHYSICAL THERAPIST

## 2020-03-17 PROCEDURE — 97140 MANUAL THERAPY 1/> REGIONS: CPT | Performed by: PHYSICAL THERAPIST

## 2020-03-17 NOTE — PROGRESS NOTES
Physical Therapy Daily Treatment Note     Name: Chad Pringle  Clinic Number: [id number]    Therapy Diagnosis:   Encounter Diagnoses   Name Primary?    Acute pain of left shoulder     Weakness      Physician: Keron Aguila III, *    Visit Date: 3/17/2020    Physician Orders: PT Eval and Treat RTC Repair, large tear  Medical Diagnosis from Referral:   S46.012A (ICD-10-CM) - Traumatic tear of left rotator cuff, unspecified tear extent, initial encounter   M75.22 (ICD-10-CM) - Biceps tendinitis of left upper extremity   S43.432A (ICD-10-CM) - Superior glenoid labrum lesion of left shoulder, initial encounter   Z98.890 (ICD-10-CM) - S/P rotator cuff surgery         Time In:0700  Time Out: 0815  Total Billable Time: 60 minutes    Precautions: Standard    Subjective     Pt reports: pt states shoulder is doing well. Still sore at times but feels motion is improving  He was compliant with home exercise program.  Response to previous treatment: improved ROM  Functional change: improved ROM    Pain: 3-4/10  Location: left shoulder      Objective     Chad received therapeutic exercises to develop strength, endurance, ROM and flexibility for 38 minutes including:  Shoulder rolls  scap up down  scap f/b  UBE 3/3  Pulley flexion and scaption3/3/3  Prone row5 lbs 3 x 15   chest press 3 x 10 single arm  Supine shoulder flexion  sidelying ABD  Prone row3 lbs 3 x 10  Wall slide flexion 30x  Wall slide with abd lowering 30 x   Table walk outs 2 x 10  Standing no moneys 30 x 3 second hold      Chad received the following manual therapy techniques: Joint mobilizations and Manual traction were applied to the: L shoulder for 15 minutes, including:  Long axis traction, prom all ranges, aarom all ranges, inferior mobs      Home Exercises Provided and Patient Education Provided     Education provided:   - reviewed hep    Written Home Exercises Provided: Patient instructed to cont prior HEP.  Exercises were reviewed and Chad was able  to demonstrate them prior to the end of the session.  Chad demonstrated good  understanding of the education provided.     See EMR under Patient Instructions for exercises provided 3/17/20.    Assessment     Improving ROM in all ranges , shows good understanding of new hep. Still has some pseudo shrug in standing elevation. Will keep him in aarom for the time being  Chad is progressing well towards his goals.   Pt prognosis is Good.     Pt will continue to benefit from skilled outpatient physical therapy to address the deficits listed in the problem list box on initial evaluation, provide pt/family education and to maximize pt's level of independence in the home and community environment.     Pt's spiritual, cultural and educational needs considered and pt agreeable to plan of care and goals.     Anticipated barriers to physical therapy: none    GOALS: Short Term Goals:  8 weeks  1.Report decreased L shoulder pain < / =  3/10  to increase tolerance for daily activities  2. Increase PROM full in all ranges, AROM 50% or better   3. Increased strength by 1/3 MMT grade in LUE to increase tolerance for ADL and work activities.  4. Pt to tolerate HEP to improve ROM and independence with ADL's  5. Pt reports subjective improvement by 50%     Long Term Goals: 16 weeks  1.Report decreased L shoulder pain  < / =  1 /10  to increase tolerance for daily and work activites  2.Increase AROM to full and equal to RUE  3.Increase strength to >/= 4/5 in RUE to increase tolerance for ADL and work activities.  4. Pt goal: return to work without restriction  5. Pt will have subjective improvement of 85% or better       Plan     Cont per poc, increase as tolerated    Wilver Higgins, PT

## 2020-03-24 ENCOUNTER — CLINICAL SUPPORT (OUTPATIENT)
Dept: REHABILITATION | Facility: HOSPITAL | Age: 65
End: 2020-03-24
Payer: COMMERCIAL

## 2020-03-24 DIAGNOSIS — M25.512 ACUTE PAIN OF LEFT SHOULDER: ICD-10-CM

## 2020-03-24 DIAGNOSIS — R53.1 WEAKNESS: ICD-10-CM

## 2020-03-24 PROCEDURE — 97140 MANUAL THERAPY 1/> REGIONS: CPT | Performed by: PHYSICAL THERAPIST

## 2020-03-24 PROCEDURE — 97110 THERAPEUTIC EXERCISES: CPT | Performed by: PHYSICAL THERAPIST

## 2020-03-24 NOTE — PROGRESS NOTES
Physical Therapy Daily Treatment Note     Name: Chad Pringle  Clinic Number: [id number]    Therapy Diagnosis:   Encounter Diagnoses   Name Primary?    Acute pain of left shoulder     Weakness      Physician: Keron Aguila III, *    Visit Date: 3/24/2020    Physician Orders: PT Eval and Treat RTC Repair, large tear  Medical Diagnosis from Referral:   S46.012A (ICD-10-CM) - Traumatic tear of left rotator cuff, unspecified tear extent, initial encounter   M75.22 (ICD-10-CM) - Biceps tendinitis of left upper extremity   S43.432A (ICD-10-CM) - Superior glenoid labrum lesion of left shoulder, initial encounter   Z98.890 (ICD-10-CM) - S/P rotator cuff surgery         Time In:0700  Time Out: 0805  Total Billable Time: 65 minutes    Precautions: Standard     Surgery Date: 1/9/20    Subjective     10 weeks 5 days post op    Pt reports: that shoulder is doing much better. Performing hep regularly. Pain is decreasing.     He was compliant with home exercise program.  Response to previous treatment: improved ROM  Functional change: improved ROM    Pain:1-3/10  Location: left shoulder      Objective     Chad received therapeutic exercises to develop strength, endurance, ROM and flexibility for 38 minutes including:    UBE 3/3  Pulley flexion and scaption3/3/3  Prone row5 lbs 3 x 15   chest press 3 x 10 single arm  sidelying ABD  Wall slide flexion 30x  Wall slide with abd lowering 30 x   sidelying ER 3 x ME  Standing shoulder flexion to 90  Standing shoulder Scaption to 90  Self inferior mob between sets      Chad received the following manual therapy techniques: Joint mobilizations and Manual traction were applied to the: L shoulder for 15 minutes, including:  Long axis traction, prom all ranges, aarom all ranges, inferior mobs      Home Exercises Provided and Patient Education Provided     Education provided:   - reviewed hep    Written Home Exercises Provided: Patient instructed to cont prior HEP.  Exercises were  reviewed and Chad was able to demonstrate them prior to the end of the session.  Chad demonstrated good  understanding of the education provided.     See EMR under Patient Instructions for exercises provided 3/17/20.    Assessment     Improving improving AROM, less shrug with elevation.  Chad is progressing well towards his goals.   Pt prognosis is Good.     Pt will continue to benefit from skilled outpatient physical therapy to address the deficits listed in the problem list box on initial evaluation, provide pt/family education and to maximize pt's level of independence in the home and community environment.     Pt's spiritual, cultural and educational needs considered and pt agreeable to plan of care and goals.     Anticipated barriers to physical therapy: none    GOALS: Short Term Goals:  8 weeks  1.Report decreased L shoulder pain < / =  3/10  to increase tolerance for daily activities  2. Increase PROM full in all ranges, AROM 50% or better   3. Increased strength by 1/3 MMT grade in LUE to increase tolerance for ADL and work activities.  4. Pt to tolerate HEP to improve ROM and independence with ADL's  5. Pt reports subjective improvement by 50%     Long Term Goals: 16 weeks  1.Report decreased L shoulder pain  < / =  1 /10  to increase tolerance for daily and work activites  2.Increase AROM to full and equal to RUE  3.Increase strength to >/= 4/5 in RUE to increase tolerance for ADL and work activities.  4. Pt goal: return to work without restriction  5. Pt will have subjective improvement of 85% or better       Plan     Cont per poc, increase as tolerated    Wilver Higgins, PT

## 2020-03-31 ENCOUNTER — CLINICAL SUPPORT (OUTPATIENT)
Dept: REHABILITATION | Facility: HOSPITAL | Age: 65
End: 2020-03-31
Payer: COMMERCIAL

## 2020-03-31 DIAGNOSIS — R53.1 WEAKNESS: ICD-10-CM

## 2020-03-31 DIAGNOSIS — M25.512 ACUTE PAIN OF LEFT SHOULDER: ICD-10-CM

## 2020-03-31 PROCEDURE — 97110 THERAPEUTIC EXERCISES: CPT | Performed by: PHYSICAL THERAPIST

## 2020-03-31 NOTE — PROGRESS NOTES
Physical Therapy Daily Treatment Note     Name: Chad Pringle  Clinic Number: [id number]    Therapy Diagnosis:   Encounter Diagnoses   Name Primary?    Acute pain of left shoulder     Weakness      Physician: Keron Aguila III, *    Visit Date: 3/31/2020    Physician Orders: PT Eval and Treat RTC Repair, large tear  Medical Diagnosis from Referral:   S46.012A (ICD-10-CM) - Traumatic tear of left rotator cuff, unspecified tear extent, initial encounter   M75.22 (ICD-10-CM) - Biceps tendinitis of left upper extremity   S43.432A (ICD-10-CM) - Superior glenoid labrum lesion of left shoulder, initial encounter   Z98.890 (ICD-10-CM) - S/P rotator cuff surgery         Time In:0700  Time Out: 0820  Total Billable Time: 70 minutes    Precautions: Standard     Surgery Date: 1/9/20    Subjective     11 weeks 5 days post op    Pt reports: reports ROM is improving. Does still has aching discomfort generalized L shoulder    He was compliant with home exercise program.  Response to previous treatment: improved ROM  Functional change: improved ROM    Pain:1-3/10  Location: left shoulder      Objective     Chad received therapeutic exercises to develop strength, endurance, ROM and flexibility for 70 minutes including:    UBE 3/3  Pulley flexion and scaption3/3/3  Prone row 6 lbs 3 x 15   chest press 3 x 10 single arm  sidelying ABD  Active flexion 30x  Active flexion with abd lowering 30 x   sidelying ER 3 x 15 1 lbs  Standing shoulder flexion to 90, scaption 90, abd to 90( no weight), 1 lbs 3 x 10  Self inferior mob between sets  4 way doorway stretch 1 x 1 mins each  Prone T 3 x 15      Chad received the following manual therapy techniques: Joint mobilizations and Manual traction were applied to the: L shoulder for 0 minutes, including:      Home Exercises Provided and Patient Education Provided     Education provided:   - reviewed hep    Written Home Exercises Provided: Patient instructed to cont prior HEP.  Exercises were  reviewed and Chad was able to demonstrate them prior to the end of the session.  Chad demonstrated good  understanding of the education provided.     See EMR under Patient Instructions for exercises provided 3/31/20.    Assessment     AROM is progressing fast. No significant pain with lowering but some mild soreness. Shows good understanding of new exercises.  Chad is progressing well towards his goals.   Pt prognosis is Good.     Pt will continue to benefit from skilled outpatient physical therapy to address the deficits listed in the problem list box on initial evaluation, provide pt/family education and to maximize pt's level of independence in the home and community environment.     Pt's spiritual, cultural and educational needs considered and pt agreeable to plan of care and goals.     Anticipated barriers to physical therapy: none    GOALS: Short Term Goals:  8 weeks  1.Report decreased L shoulder pain < / =  3/10  to increase tolerance for daily activities  2. Increase PROM full in all ranges, AROM 50% or better   3. Increased strength by 1/3 MMT grade in LUE to increase tolerance for ADL and work activities.  4. Pt to tolerate HEP to improve ROM and independence with ADL's  5. Pt reports subjective improvement by 50%     Long Term Goals: 16 weeks  1.Report decreased L shoulder pain  < / =  1 /10  to increase tolerance for daily and work activites  2.Increase AROM to full and equal to RUE  3.Increase strength to >/= 4/5 in RUE to increase tolerance for ADL and work activities.  4. Pt goal: return to work without restriction  5. Pt will have subjective improvement of 85% or better       Plan     Cont per poc, increase as tolerated    Wilver Higgins, PT

## 2020-04-01 ENCOUNTER — PATIENT MESSAGE (OUTPATIENT)
Dept: SPORTS MEDICINE | Facility: CLINIC | Age: 65
End: 2020-04-01

## 2020-04-07 ENCOUNTER — CLINICAL SUPPORT (OUTPATIENT)
Dept: REHABILITATION | Facility: HOSPITAL | Age: 65
End: 2020-04-07
Attending: INTERNAL MEDICINE
Payer: COMMERCIAL

## 2020-04-07 DIAGNOSIS — R53.1 WEAKNESS: ICD-10-CM

## 2020-04-07 DIAGNOSIS — M25.512 ACUTE PAIN OF LEFT SHOULDER: ICD-10-CM

## 2020-04-07 PROCEDURE — 97110 THERAPEUTIC EXERCISES: CPT | Performed by: PHYSICAL THERAPIST

## 2020-04-07 NOTE — PROGRESS NOTES
Physical Therapy Daily Treatment Note     Name: Chad Pringle  Clinic Number: [id number]    Therapy Diagnosis:   Encounter Diagnoses   Name Primary?    Acute pain of left shoulder     Weakness      Physician: Keron Aguila III, *    Visit Date: 4/7/2020    Physician Orders: PT Eval and Treat RTC Repair, large tear  Medical Diagnosis from Referral:   S46.012A (ICD-10-CM) - Traumatic tear of left rotator cuff, unspecified tear extent, initial encounter   M75.22 (ICD-10-CM) - Biceps tendinitis of left upper extremity   S43.432A (ICD-10-CM) - Superior glenoid labrum lesion of left shoulder, initial encounter   Z98.890 (ICD-10-CM) - S/P rotator cuff surgery         Time In:0700  Time Out:0815  Total Billable Time: 60 minutes    Precautions: Standard     Surgery Date: 1/9/20    Subjective     12 weeks 5 days post op    Pt reports: pt states that shoulder is doing well but still having pain with activities    He was compliant with home exercise program.  Response to previous treatment: improved ROM  Functional change: improved ROM    Pain:1-3/10  Location: left shoulder      Objective     Chad received therapeutic exercises to develop strength, endurance, ROM and flexibility for 60 minutes including:  UBE 3/3  Pulley flexion and scaption 2/2/2  4 way doorway stretch 1 x 1 mins  SAPD 3 x 10 yellow  T band rows 3 x 10 yellow  Standing ER 3 x10 yellow  Standing IR 3 x 10 orange  Seated ER at 45 ABD 1 lbs 3 x10  Seated ER at 90 ABD 1 lbs 3 x 10  Shoulder Flexion1 lb s 3 x 10  Shoulder ABD 3 x 10  Lower Scap lift off wall 3 x 10      Chad received the following manual therapy techniques: Joint mobilizations and Manual traction were applied to the: L shoulder for 0 minutes, including:      Home Exercises Provided and Patient Education Provided     Education provided:   - reviewed hep    Written Home Exercises Provided: Patient instructed to cont prior HEP.  Exercises were reviewed and Chad was able to demonstrate them  prior to the end of the session.  Chad demonstrated good  understanding of the education provided.     See EMR under Patient Instructions for exercises provided 3/31/20.    Assessment     New exercises are difficult but not painful. Will decrease frequency to 1 x every 2 weeks to allow for performance of hep. Shows good understanding of new exercises.  Chad is progressing well towards his goals.   Pt prognosis is Good.     Pt will continue to benefit from skilled outpatient physical therapy to address the deficits listed in the problem list box on initial evaluation, provide pt/family education and to maximize pt's level of independence in the home and community environment.     Pt's spiritual, cultural and educational needs considered and pt agreeable to plan of care and goals.     Anticipated barriers to physical therapy: none    GOALS: Short Term Goals:  8 weeks  1.Report decreased L shoulder pain < / =  3/10  to increase tolerance for daily activities  2. Increase PROM full in all ranges, AROM 50% or better   3. Increased strength by 1/3 MMT grade in LUE to increase tolerance for ADL and work activities.  4. Pt to tolerate HEP to improve ROM and independence with ADL's  5. Pt reports subjective improvement by 50%     Long Term Goals: 16 weeks  1.Report decreased L shoulder pain  < / =  1 /10  to increase tolerance for daily and work activites  2.Increase AROM to full and equal to RUE  3.Increase strength to >/= 4/5 in RUE to increase tolerance for ADL and work activities.  4. Pt goal: return to work without restriction  5. Pt will have subjective improvement of 85% or better       Plan     Cont per poc, increase as tolerated, decrease frequency to 1 x every 2 weeks    Wilver Higgins, PT

## 2020-04-08 ENCOUNTER — OFFICE VISIT (OUTPATIENT)
Dept: SPORTS MEDICINE | Facility: CLINIC | Age: 65
End: 2020-04-08
Payer: COMMERCIAL

## 2020-04-08 DIAGNOSIS — Z98.890 S/P ROTATOR CUFF SURGERY: Primary | ICD-10-CM

## 2020-04-08 PROCEDURE — 99024 PR POST-OP FOLLOW-UP VISIT: ICD-10-PCS | Mod: GT,,, | Performed by: ORTHOPAEDIC SURGERY

## 2020-04-08 PROCEDURE — 99024 POSTOP FOLLOW-UP VISIT: CPT | Mod: GT,,, | Performed by: ORTHOPAEDIC SURGERY

## 2020-04-08 NOTE — PROGRESS NOTES
Chief Complaint: 12 week shoulder surgery f/u    HISTORY OF PRESENT ILLNESS:   Pt is here today for post-operative followup of shoulder arthroscopy.  he is doing well.  We have reviewed patient's findings and discussed plan of care and future treatment options.      SANE preop 20  SANE 80    DATE OF PROCEDURE: 01/09/2020   SURGEON: Rima Covarrubias M.D.  OPERATION:   left  1. Shoulder arthroscopic rotator cuff repair 35 x 20 mm, large, double row (CPT 89422) (complex, -22 modifier)  2. Shoulder arthroscopic biceps tenodesis (CPT 76575)  3. Shoulder arthroscopic subacromial decompression, bursectomy   4. Shoulder arthroscopic extensive debridement (anterior, posterior glenohumeral joint, subacromial space) (CPT 97139)  5. Shoulder arthroscopic microfracture (Crimson duvet technique) (CPT 94987)  6. Shoulder arthroscopic lysis of adhesions (CPT 89039)                                                                                  PHYSICAL EXAMINATION:     Incision sites healed well  No evidence of any erythema, infection or induration  elbow Range of motion full   Minimal effusion  2+ Radial pulses  No swelling    ER 0 45  IR T12                                                                               ASSESSMENT:                                                                                                                                               1. Status post above, doing well.                                                                                                                               PLAN:                                                                                                                                                     1. Continue PT virtual only /HEP x 4-8 weeks ok, with weekly virtual/notes to PT  2. Emphasized scapular function.  3. I have discussed return to activity in detail.  4. Will see us back in 8-10 weeks, virtual in 4 weeks                                    5. All questions were answered and she should contact us if she  has any questions or concerns in the interim        Will continue his FMLA to start work on April 13th full duty.

## 2020-04-13 ENCOUNTER — PATIENT MESSAGE (OUTPATIENT)
Dept: SPORTS MEDICINE | Facility: CLINIC | Age: 65
End: 2020-04-13

## 2020-04-16 ENCOUNTER — TELEPHONE (OUTPATIENT)
Dept: REHABILITATION | Facility: HOSPITAL | Age: 65
End: 2020-04-16

## 2020-04-16 NOTE — TELEPHONE ENCOUNTER
Spoke with Mr Pino regarding how his hep is going. He states that he is doing well and that he feels he is making progress. States he still has some dull pain during the day and the occasional pop in his shoulder. States this pop is not very painful. I spoke with him about frequency of exercises and load management. I will send him and updated hep of exercises and follow up with him in 1-2 weeks. Pt is in agreement with this. All of the patients questions were answered.

## 2020-04-30 ENCOUNTER — TELEPHONE (OUTPATIENT)
Dept: SPORTS MEDICINE | Facility: CLINIC | Age: 65
End: 2020-04-30

## 2020-05-06 ENCOUNTER — OFFICE VISIT (OUTPATIENT)
Dept: SPORTS MEDICINE | Facility: CLINIC | Age: 65
End: 2020-05-06
Payer: COMMERCIAL

## 2020-05-06 DIAGNOSIS — Z98.890 S/P ROTATOR CUFF SURGERY: Primary | ICD-10-CM

## 2020-05-06 PROCEDURE — 99212 PR OFFICE/OUTPT VISIT, EST, LEVL II, 10-19 MIN: ICD-10-PCS | Mod: 95,,, | Performed by: ORTHOPAEDIC SURGERY

## 2020-05-06 PROCEDURE — 99212 OFFICE O/P EST SF 10 MIN: CPT | Mod: 95,,, | Performed by: ORTHOPAEDIC SURGERY

## 2020-05-06 NOTE — PROGRESS NOTES
Established Patient - Audio Only Telehealth Visit    The patient location is: home   The chief complaint leading to consultation is: L shoulder pain  Visit type: Virtual visit with audio only (telephone)  Total time spent with patient (mins): 15    The reason for the audio only service rather than synchronous audio and video virtual visit was related to technical difficulties or patient preference/necessity.     Each patient to whom I provide medical services by telemedicine is:  (1) informed of the relationship between the physician and patient and the respective role of any other health care provider with respect to management of the patient; and (2) notified that they may decline to receive medical services by telemedicine and may withdraw from such care at any time. Patient verbally consented to receive this service via voice-only telephone call.     This service was not originating from a related E/M service provided within the previous 7 days nor will  to an E/M service or procedure within the next 24 hours or my soonest available appointment.  Prevailing standard of care was able to be met in this audio-only visit.      Chief Complaint: 16 week shoulder surgery f/u    HISTORY OF PRESENT ILLNESS:   Pt is here today for post-operative followup of shoulder arthroscopy.  he is doing well.  We have reviewed patient's findings and discussed plan of care and future treatment options.      SANE preop 20  SANE 80    DATE OF PROCEDURE: 01/09/2020   SURGEON: Rima Covarrubias M.D.  OPERATION:   left  1. Shoulder arthroscopic rotator cuff repair 35 x 20 mm, large, double row (CPT 53877) (complex, -22 modifier)  2. Shoulder arthroscopic biceps tenodesis (CPT 37003)  3. Shoulder arthroscopic subacromial decompression, bursectomy   4. Shoulder arthroscopic extensive debridement (anterior, posterior glenohumeral joint, subacromial space) (CPT 27565)  5. Shoulder arthroscopic microfracture (Crimson duvet technique) (CPT  13258)  6. Shoulder arthroscopic lysis of adhesions (CPT 06370)                                                                                  PHYSICAL EXAMINATION:     Incision sites healed well  No evidence of any erythema, infection or induration  elbow Range of motion full   Minimal effusion  2+ Radial pulses  No swelling    ER 0 45  IR T12                                                                               ASSESSMENT:                                                                                                                                               1. Status post above, doing well.                                                                                                                               PLAN:                                                                                                                                                     1. Continue PT virtual only /HEP x 4-8 weeks ok, with weekly virtual/notes to PT  2. Emphasized scapular function.  3. I have discussed return to activity in detail.  4. Will see us back in 4-6 weeks                                   5. All questions were answered and she should contact us if she  has any questions or concerns in the interim        Will continue his FMLA to start work on April 13th full duty.

## 2020-06-08 ENCOUNTER — OFFICE VISIT (OUTPATIENT)
Dept: SPORTS MEDICINE | Facility: CLINIC | Age: 65
End: 2020-06-08
Payer: COMMERCIAL

## 2020-06-08 VITALS
HEART RATE: 69 BPM | DIASTOLIC BLOOD PRESSURE: 86 MMHG | WEIGHT: 168 LBS | HEIGHT: 64 IN | BODY MASS INDEX: 28.68 KG/M2 | SYSTOLIC BLOOD PRESSURE: 137 MMHG

## 2020-06-08 DIAGNOSIS — Z98.890 S/P ROTATOR CUFF SURGERY: Primary | ICD-10-CM

## 2020-06-08 PROCEDURE — 99214 PR OFFICE/OUTPT VISIT, EST, LEVL IV, 30-39 MIN: ICD-10-PCS | Mod: S$GLB,,, | Performed by: ORTHOPAEDIC SURGERY

## 2020-06-08 PROCEDURE — 3008F BODY MASS INDEX DOCD: CPT | Mod: CPTII,S$GLB,, | Performed by: ORTHOPAEDIC SURGERY

## 2020-06-08 PROCEDURE — 3079F DIAST BP 80-89 MM HG: CPT | Mod: CPTII,S$GLB,, | Performed by: ORTHOPAEDIC SURGERY

## 2020-06-08 PROCEDURE — 3008F PR BODY MASS INDEX (BMI) DOCUMENTED: ICD-10-PCS | Mod: CPTII,S$GLB,, | Performed by: ORTHOPAEDIC SURGERY

## 2020-06-08 PROCEDURE — 3075F SYST BP GE 130 - 139MM HG: CPT | Mod: CPTII,S$GLB,, | Performed by: ORTHOPAEDIC SURGERY

## 2020-06-08 PROCEDURE — 99999 PR PBB SHADOW E&M-EST. PATIENT-LVL III: CPT | Mod: PBBFAC,,, | Performed by: ORTHOPAEDIC SURGERY

## 2020-06-08 PROCEDURE — 99999 PR PBB SHADOW E&M-EST. PATIENT-LVL III: ICD-10-PCS | Mod: PBBFAC,,, | Performed by: ORTHOPAEDIC SURGERY

## 2020-06-08 PROCEDURE — 3075F PR MOST RECENT SYSTOLIC BLOOD PRESS GE 130-139MM HG: ICD-10-PCS | Mod: CPTII,S$GLB,, | Performed by: ORTHOPAEDIC SURGERY

## 2020-06-08 PROCEDURE — 3079F PR MOST RECENT DIASTOLIC BLOOD PRESSURE 80-89 MM HG: ICD-10-PCS | Mod: CPTII,S$GLB,, | Performed by: ORTHOPAEDIC SURGERY

## 2020-06-08 PROCEDURE — 99214 OFFICE O/P EST MOD 30 MIN: CPT | Mod: S$GLB,,, | Performed by: ORTHOPAEDIC SURGERY

## 2020-06-08 NOTE — PROGRESS NOTES
Chief Complaint: Left shoulder pain     HISTORY OF PRESENT ILLNESS:   Pt is here today for followup of shoulder arthroscopy.  he is doing well.  We have reviewed patient's findings and discussed plan of care and future treatment options.      Patient was attending physical therapy at the [facility] location, working with . He discontinued therapy due to COVID 19 pandemic  He notes doing his HEP every other day, twice a day     He notes having good motion but he still has pain and weakness     SANE preop 20  SANE 80    DATE OF PROCEDURE: 01/09/2020   OPERATION:   left  1. Shoulder arthroscopic rotator cuff repair 35 x 20 mm, large, double row (CPT 46967) (complex, -22 modifier)  2. Shoulder arthroscopic biceps tenodesis (CPT 44779)  3. Shoulder arthroscopic subacromial decompression, bursectomy   4. Shoulder arthroscopic extensive debridement (anterior, posterior glenohumeral joint, subacromial space) (CPT 39908)  5. Shoulder arthroscopic microfracture (Crimson duvet technique) (CPT 01521)  6. Shoulder arthroscopic lysis of adhesions (CPT 59183)       Review of Systems   Constitution: Negative. Negative for chills, fever and night sweats.   HENT: Negative for congestion and headaches.    Eyes: Negative for blurred vision, left vision loss and right vision loss.   Cardiovascular: Negative for chest pain and syncope.   Respiratory: Negative for cough and shortness of breath.    Endocrine: Negative for polydipsia, polyphagia and polyuria.   Hematologic/Lymphatic: Negative for bleeding problem. Does not bruise/bleed easily.   Skin: Negative for dry skin, itching and rash.   Musculoskeletal: Negative for falls and muscle weakness.   Gastrointestinal: Negative for abdominal pain and bowel incontinence.   Genitourinary: Negative for bladder incontinence and nocturia.   Neurological: Negative for disturbances in coordination, loss of balance and seizures.   Psychiatric/Behavioral: Negative for depression. The patient  does not have insomnia.    Allergic/Immunologic: Negative for hives and persistent infections.       PAST MEDICAL HISTORY:   Past Medical History:   Diagnosis Date    High cholesterol     Hypertension     Hypertrophy of prostate without urinary obstruction and other lower urinary tract symptoms (LUTS)     Nocturia     Type II or unspecified type diabetes mellitus without mention of complication, not stated as uncontrolled     Unspecified disorder of male genital organs      PAST SURGICAL HISTORY:   Past Surgical History:   Procedure Laterality Date    ARTHROSCOPIC DEBRIDEMENT OF SHOULDER Left 1/9/2020    Procedure: EXTENSIVE DEBRIDEMENT, SHOULDER, ARTHROSCOPIC;  Surgeon: Rima Covarrubias MD;  Location: [facility] OR;  Service: Orthopedics;  Laterality: Left;    ARTHROSCOPIC REPAIR OF ROTATOR CUFF OF SHOULDER Left 1/9/2020    Procedure: REPAIR, ROTATOR CUFF, ARTHROSCOPIC, LARGE DOUBLE ROW;  Surgeon: Rima Covarrubias MD;  Location: [facility] OR;  Service: Orthopedics;  Laterality: Left;    ARTHROSCOPY OF SHOULDER WITH DECOMPRESSION OF SUBACROMIAL SPACE Left 1/9/2020    Procedure: ARTHROSCOPY, SHOULDER, WITH SUBACROMIAL  DECOMPRESSION;  Surgeon: Rima Covarrubias MD;  Location: [facility] OR;  Service: Orthopedics;  Laterality: Left;    EXCISION OF BURSA Left 1/9/2020    Procedure: BURSECTOMY;  Surgeon: Rima Covarrubias MD;  Location: [facility] OR;  Service: Orthopedics;  Laterality: Left;    KNEE SURGERY       FAMILY HISTORY:   Family History   Family history unknown: Yes     SOCIAL HISTORY:   Social History     Socioeconomic History    Marital status:      Spouse name: Not on file    Number of children: Not on file    Years of education: Not on file    Highest education level: Not on file   Occupational History    Not on file   Social Needs    Financial resource strain: Not on file    Food insecurity:     Worry: Not on file     Inability: Not on file    Transportation needs:     Medical: Not on file     Non-medical: Not on file    Tobacco Use    Smoking status: Never Smoker    Smokeless tobacco: Never Used   Substance and Sexual Activity    Alcohol use: Yes     Comment: socially    Drug use: No    Sexual activity: Yes   Lifestyle    Physical activity:     Days per week: Not on file     Minutes per session: Not on file    Stress: Not on file   Relationships    Social connections:     Talks on phone: Not on file     Gets together: Not on file     Attends L[employer] service: Not on file     Active member of club or organization: Not on file     Attends meetings of clubs or organizations: Not on file     Relationship status: Not on file   Other Topics Concern    Not on file   Social History Narrative    Not on file       MEDICATIONS:   Current Outpatient Medications:     ALPRAZolam (XANAX) 0.25 MG tablet, Take 1 tablet (0.25 mg total) by mouth daily as needed., Disp: 30 tablet, Rfl: 2    aspirin (ECOTRIN) 81 MG EC tablet, Take 81 mg by mouth once daily., Disp: , Rfl:     dapagliflozin (FARXIGA) 5 mg Tab tablet, Take 5 mg by mouth once daily., Disp: , Rfl:     gabapentin (NEURONTIN) 100 MG capsule, TAKE 1 CAPSULE BY MOUTH EVERY DAY AT NIGHT, Disp: 90 capsule, Rfl: 1    JANUMET XR 50-1,000 mg TM24, Take 1 tablet by mouth 2 (two) times daily., Disp: 180 tablet, Rfl: 3    lisinopriL 10 MG tablet, TAKE 1 TABLET BY MOUTH EVERY DAY, Disp: 90 tablet, Rfl: 3    nitroGLYCERIN (NITROSTAT) 0.4 MG SL tablet, Place 0.4 mg under the tongue every 5 (five) minutes as needed for Chest pain., Disp: , Rfl:     promethazine (PHENERGAN) 25 MG tablet, Take 1 tablet (25 mg total) by mouth every 6 (six) hours as needed for Nausea., Disp: 12 tablet, Rfl: 0    rosuvastatin (CRESTOR) 20 MG tablet, TAKE 1 TABLET BY MOUTH EVERY DAY IN THE EVENING, Disp: 90 tablet, Rfl: 3    temazepam (RESTORIL) 30 mg capsule, Take 1 capsule (30 mg total) by mouth nightly as needed., Disp: 30 capsule, Rfl: 2  ALLERGIES: Review of patient's allergies indicates:  No Known  "Allergies    VITAL SIGNS: /86   Pulse 69   Ht 5' 4" (1.626 m)   Wt 76.2 kg (168 lb)   BMI 28.84 kg/m²                                                                                   PHYSICAL EXAMINATION:     Incision sites healed well  No evidence of any erythema, infection or induration  elbow Range of motion full   No effusion  2+ Radial pulses  No swelling     Mild + tenderness bicipital groove and AC joint       ER 0 45  IR T11             Strength:  Scaption at 0: 5/5  Scaption at 30: 5/5  ER: 5/5                                                                      ASSESSMENT:                                                                                                                                               1. Status post above, doing well.                                                                                                                               PLAN:                                                                                                                                                     1. Continue PT   2. Emphasized scapular function.  3. I have discussed return to activity in detail.  4. Will see us back in 2 months                                 5. All questions were answered and she should contact us if she  has any questions or concerns in the interim                                                           "

## 2020-06-17 ENCOUNTER — CLINICAL SUPPORT (OUTPATIENT)
Dept: REHABILITATION | Facility: HOSPITAL | Age: 65
End: 2020-06-17
Attending: ORTHOPAEDIC SURGERY
Payer: COMMERCIAL

## 2020-06-17 DIAGNOSIS — R53.1 WEAKNESS: ICD-10-CM

## 2020-06-17 DIAGNOSIS — Z98.890 S/P ROTATOR CUFF SURGERY: ICD-10-CM

## 2020-06-17 DIAGNOSIS — M25.512 ACUTE PAIN OF LEFT SHOULDER: ICD-10-CM

## 2020-06-17 PROCEDURE — 97110 THERAPEUTIC EXERCISES: CPT | Performed by: PHYSICAL THERAPIST

## 2020-06-17 PROCEDURE — 97164 PT RE-EVAL EST PLAN CARE: CPT | Performed by: PHYSICAL THERAPIST

## 2020-06-17 NOTE — PROGRESS NOTES
Outpatient Therapy Updated Plan of Care     Visit Date: 6/17/2020  Name: Chad Pringle  Clinic Number: [id number]    Therapy Diagnosis:   Encounter Diagnoses   Name Primary?    S/P rotator cuff surgery     Acute pain of left shoulder     Weakness      Physician: Rima Covarrubias MD    Physician Orders: PT Eval and Treat RTC Repair, large tear  Medical Diagnosis from Referral:   S46.012A (ICD-10-CM) - Traumatic tear of left rotator cuff, unspecified tear extent, initial encounter   M75.22 (ICD-10-CM) - Biceps tendinitis of left upper extremity   S43.432A (ICD-10-CM) - Superior glenoid labrum lesion of left shoulder, initial encounter   Z98.890 (ICD-10-CM) - S/P rotator cuff surgery         Evaluation Date: 3/3/2020  Authorization Period Expiration: 2-25-21  Plan of Care Expiration: 9-1-20  Visit # / Visits authorized: 1/20      Current Certification Period:  3/3/20 to 9-1-20  Precautions:  none  Visits from Evaluation Date:  eval +7 visits  Functional Level Prior to Evaluation:  Limited use of UE secondary to sx.    Subjective     Update: pt states that he is doing well. States he has been performing hep whille not in PT due to covid 19 concerns. Reports that he feels ROM is doing well but still has some stiffness at times and aching pain when at rest after a long day. Feels that strength is not as good as it could be. Pt is 5 months post op L RTC repair. He is back at work. Reports no issues with work with regards to his L shoulder  Pain 0-2/10    Objective     Observation: mild L shoulder muscle atrophy compared to R shoulder    Posture: FHRS posture      Passive Range of Motion (*= pain):   Shoulder Right Left   Flexion  170   Abduction  170   ER at 0  50   ER at 90  80   IR  70      Active Range of Motion(*= pain):   Shoulder Right Left   Flexion  160   Abduction  160   ER at 0  45   ER at 90  70   HBB  T12   HBH  T3     Strength (*= pain):  Shoulder Right Left   Flexion  4-   Abduction  4-   ER  4-   IR  4    Elbow flx  4+   Elbow ext  4       Special Tests:   Right Left   AC Joint Compression Test N N   Empty Can Test N P  For weakness   Drop Arm test N N   Subscaputlaris Lift Off N N   Clunk test N N   O'wilbert's test N N   Hawkin's Kenndy N N   Neer's Test N P for pain at end range flexion   Speed's test N N   Anterior Apprehension test N Pt reports posterior shoulder pain that is improved with A/P pressure   Relocation test N N   Posterior Apprehension test N N   Sulcus Sign N N   Crossover adduction N N       Scapular Control/Dyskinesis:     Normal / Subtle / Obvious  Comments    Left  SUBTLE Difficulty with medial tilt/adduction at end range flexion/abd   Right  normal n/a       Joint Mobility: Mild posterior capsule tightness    Palpation: no tenderness to palpation noted    Proximal/distal screen:   Cervical: ROM limited by 25% grossly but with no symptoms    Sensation: intact LUE    Flexibility: mild tightness in L latissamus dorsi    Treatment:  Therapeutic exercise x 15 mins. Reviewed hep and updated. Copy listed in patient instruction.    Assessment     Update: pt has progressed well in regards to pain and ROM while performing hep. He has some mild tightness and has significant weakness in scapular stabilizers and RTC to warrant continued skilled PT in order to maximize his function.     GOALS: Short Term Goals:  8 weeks- all goals met  1.Report decreased L shoulder pain < / =  3/10  to increase tolerance for daily activities  2. Increase PROM full in all ranges, AROM 50% or better   3. Increased strength by 1/3 MMT grade in LUE to increase tolerance for ADL and work activities.  4. Pt to tolerate HEP to improve ROM and independence with ADL's  5. Pt reports subjective improvement by 50%     Long Term Goals: 8 weeks New STG  1.Report decreased L shoulder pain  < / =  1 /10  to increase tolerance for daily and work activites  2.Increase AROM to full and equal to RUE  3.Increase strength to >/= 4/5 in RUE to  increase tolerance for ADL and work activities.  4. Pt goal: return to work without restriction  5. Pt will have subjective improvement of 85% or better    Reasons for Recertification of Therapy:   re certified due to lapse in coverage secondary to COVID 19 concerns    Plan     Updated Certification Period: 6/17/2020 to 9-30-20  Recommended Treatment Plan: 1 times per week for 10 weeks: Manual Therapy, Moist Heat/ Ice, Neuromuscular Re-ed, Patient Education, Therapeutic Activites and Therapeutic Exercise  Other Recommendations: n/a    Wilver Higgins, PT  6/17/2020      I CERTIFY THE NEED FOR THESE SERVICES FURNISHED UNDER THIS PLAN OF TREATMENT AND WHILE UNDER MY CARE    Physician's comments:        Physician's Signature: ___________________________________________________

## 2020-06-17 NOTE — PLAN OF CARE
Outpatient Therapy Updated Plan of Care     Visit Date: 6/17/2020  Name: Chad Pringle  Clinic Number: [id number]    Therapy Diagnosis:   Encounter Diagnoses   Name Primary?    S/P rotator cuff surgery     Acute pain of left shoulder     Weakness      Physician: Rima Covarrubias MD    Physician Orders: PT Eval and Treat RTC Repair, large tear  Medical Diagnosis from Referral:   S46.012A (ICD-10-CM) - Traumatic tear of left rotator cuff, unspecified tear extent, initial encounter   M75.22 (ICD-10-CM) - Biceps tendinitis of left upper extremity   S43.432A (ICD-10-CM) - Superior glenoid labrum lesion of left shoulder, initial encounter   Z98.890 (ICD-10-CM) - S/P rotator cuff surgery         Evaluation Date: 3/3/2020  Authorization Period Expiration: 2-25-21  Plan of Care Expiration: 9-1-20  Visit # / Visits authorized: 1/20      Current Certification Period:  3/3/20 to 9-1-20  Precautions:  none  Visits from Evaluation Date:  eval +7 visits  Functional Level Prior to Evaluation:  Limited use of UE secondary to sx.    Subjective     Update: pt states that he is doing well. States he has been performing hep whille not in PT due to covid 19 concerns. Reports that he feels ROM is doing well but still has some stiffness at times and aching pain when at rest after a long day. Feels that strength is not as good as it could be. Pt is 5 months post op L RTC repair. He is back at work. Reports no issues with work with regards to his L shoulder  Pain 0-2/10    Objective     Observation: mild L shoulder muscle atrophy compared to R shoulder    Posture: FHRS posture      Passive Range of Motion (*= pain):   Shoulder Right Left   Flexion  170   Abduction  170   ER at 0  50   ER at 90  80   IR  70      Active Range of Motion(*= pain):   Shoulder Right Left   Flexion  160   Abduction  160   ER at 0  45   ER at 90  70   HBB  T12   HBH  T3     Strength (*= pain):  Shoulder Right Left   Flexion  4-   Abduction  4-   ER  4-   IR  4    Elbow flx  4+   Elbow ext  4       Special Tests:   Right Left   AC Joint Compression Test N N   Empty Can Test N P  For weakness   Drop Arm test N N   Subscaputlaris Lift Off N N   Clunk test N N   O'wilbert's test N N   Hawkin's Kenndy N N   Neer's Test N P for pain at end range flexion   Speed's test N N   Anterior Apprehension test N Pt reports posterior shoulder pain that is improved with A/P pressure   Relocation test N N   Posterior Apprehension test N N   Sulcus Sign N N   Crossover adduction N N       Scapular Control/Dyskinesis:     Normal / Subtle / Obvious  Comments    Left  SUBTLE Difficulty with medial tilt/adduction at end range flexion/abd   Right  normal n/a       Joint Mobility: Mild posterior capsule tightness    Palpation: no tenderness to palpation noted    Proximal/distal screen:   Cervical: ROM limited by 25% grossly but with no symptoms    Sensation: intact LUE    Flexibility: mild tightness in L latissamus dorsi    Treatment:  Therapeutic exercise x 15 mins. Reviewed hep and updated. Copy listed in patient instruction.    Assessment     Update: pt has progressed well in regards to pain and ROM while performing hep. He has some mild tightness and has significant weakness in scapular stabilizers and RTC to warrant continued skilled PT in order to maximize his function.     GOALS: Short Term Goals:  8 weeks- all goals met  1.Report decreased L shoulder pain < / =  3/10  to increase tolerance for daily activities  2. Increase PROM full in all ranges, AROM 50% or better   3. Increased strength by 1/3 MMT grade in LUE to increase tolerance for ADL and work activities.  4. Pt to tolerate HEP to improve ROM and independence with ADL's  5. Pt reports subjective improvement by 50%     Long Term Goals: 8 weeks New STG  1.Report decreased L shoulder pain  < / =  1 /10  to increase tolerance for daily and work activites  2.Increase AROM to full and equal to RUE  3.Increase strength to >/= 4/5 in RUE to  increase tolerance for ADL and work activities.  4. Pt goal: return to work without restriction  5. Pt will have subjective improvement of 85% or better    Reasons for Recertification of Therapy:   re certified due to lapse in coverage secondary to COVID 19 concerns    Plan     Updated Certification Period: 6/17/2020 to 9-30-20  Recommended Treatment Plan: 1 times per week for 10 weeks: Manual Therapy, Moist Heat/ Ice, Neuromuscular Re-ed, Patient Education, Therapeutic Activites and Therapeutic Exercise  Other Recommendations: n/a    Wilver Higgins, PT  6/17/2020      I CERTIFY THE NEED FOR THESE SERVICES FURNISHED UNDER THIS PLAN OF TREATMENT AND WHILE UNDER MY CARE    Physician's comments:        Physician's Signature: ___________________________________________________

## 2021-02-09 ENCOUNTER — PATIENT OUTREACH (OUTPATIENT)
Dept: ADMINISTRATIVE | Facility: HOSPITAL | Age: 66
End: 2021-02-09

## 2021-04-16 ENCOUNTER — PATIENT MESSAGE (OUTPATIENT)
Dept: RESEARCH | Facility: HOSPITAL | Age: 66
End: 2021-04-16

## 2021-07-23 ENCOUNTER — OFFICE VISIT (OUTPATIENT)
Dept: SPORTS MEDICINE | Facility: CLINIC | Age: 66
End: 2021-07-23
Payer: COMMERCIAL

## 2021-07-23 ENCOUNTER — HOSPITAL ENCOUNTER (OUTPATIENT)
Dept: RADIOLOGY | Facility: HOSPITAL | Age: 66
Discharge: HOME | End: 2021-07-23
Attending: ORTHOPAEDIC SURGERY
Payer: COMMERCIAL

## 2021-07-23 VITALS
HEART RATE: 77 BPM | BODY MASS INDEX: 28.85 KG/M2 | SYSTOLIC BLOOD PRESSURE: 122 MMHG | HEIGHT: 64 IN | DIASTOLIC BLOOD PRESSURE: 74 MMHG | WEIGHT: 169 LBS

## 2021-07-23 DIAGNOSIS — M25.511 RIGHT SHOULDER PAIN, UNSPECIFIED CHRONICITY: ICD-10-CM

## 2021-07-23 DIAGNOSIS — M25.511 ACUTE PAIN OF RIGHT SHOULDER: Primary | ICD-10-CM

## 2021-07-23 PROCEDURE — 3078F DIAST BP <80 MM HG: CPT | Mod: CPTII,S$GLB,, | Performed by: ORTHOPAEDIC SURGERY

## 2021-07-23 PROCEDURE — 3008F BODY MASS INDEX DOCD: CPT | Mod: CPTII,S$GLB,, | Performed by: ORTHOPAEDIC SURGERY

## 2021-07-23 PROCEDURE — 99214 PR OFFICE/OUTPT VISIT, EST, LEVL IV, 30-39 MIN: ICD-10-PCS | Mod: S$GLB,,, | Performed by: ORTHOPAEDIC SURGERY

## 2021-07-23 PROCEDURE — 3008F PR BODY MASS INDEX (BMI) DOCUMENTED: ICD-10-PCS | Mod: CPTII,S$GLB,, | Performed by: ORTHOPAEDIC SURGERY

## 2021-07-23 PROCEDURE — 1125F AMNT PAIN NOTED PAIN PRSNT: CPT | Mod: CPTII,S$GLB,, | Performed by: ORTHOPAEDIC SURGERY

## 2021-07-23 PROCEDURE — 99214 OFFICE O/P EST MOD 30 MIN: CPT | Mod: S$GLB,,, | Performed by: ORTHOPAEDIC SURGERY

## 2021-07-23 PROCEDURE — 99999 PR PBB SHADOW E&M-EST. PATIENT-LVL III: ICD-10-PCS | Mod: PBBFAC,,, | Performed by: ORTHOPAEDIC SURGERY

## 2021-07-23 PROCEDURE — 3074F PR MOST RECENT SYSTOLIC BLOOD PRESSURE < 130 MM HG: ICD-10-PCS | Mod: CPTII,S$GLB,, | Performed by: ORTHOPAEDIC SURGERY

## 2021-07-23 PROCEDURE — 3074F SYST BP LT 130 MM HG: CPT | Mod: CPTII,S$GLB,, | Performed by: ORTHOPAEDIC SURGERY

## 2021-07-23 PROCEDURE — 73030 X-RAY EXAM OF SHOULDER: CPT | Mod: 26,RT,, | Performed by: RADIOLOGY

## 2021-07-23 PROCEDURE — 3078F PR MOST RECENT DIASTOLIC BLOOD PRESSURE < 80 MM HG: ICD-10-PCS | Mod: CPTII,S$GLB,, | Performed by: ORTHOPAEDIC SURGERY

## 2021-07-23 PROCEDURE — 73030 XR SHOULDER COMPLETE 2 OR MORE VIEWS RIGHT: ICD-10-PCS | Mod: 26,RT,, | Performed by: RADIOLOGY

## 2021-07-23 PROCEDURE — 73030 X-RAY EXAM OF SHOULDER: CPT | Mod: TC,RT

## 2021-07-23 PROCEDURE — 99999 PR PBB SHADOW E&M-EST. PATIENT-LVL III: CPT | Mod: PBBFAC,,, | Performed by: ORTHOPAEDIC SURGERY

## 2021-07-23 PROCEDURE — 1125F PR PAIN SEVERITY QUANTIFIED, PAIN PRESENT: ICD-10-PCS | Mod: CPTII,S$GLB,, | Performed by: ORTHOPAEDIC SURGERY

## 2021-08-17 ENCOUNTER — HOSPITAL ENCOUNTER (OUTPATIENT)
Dept: RADIOLOGY | Facility: HOSPITAL | Age: 66
Discharge: HOME | End: 2021-08-17
Attending: ORTHOPAEDIC SURGERY
Payer: COMMERCIAL

## 2021-08-17 DIAGNOSIS — M25.511 ACUTE PAIN OF RIGHT SHOULDER: ICD-10-CM

## 2021-08-17 PROCEDURE — 73221 MRI SHOULDER WITHOUT CONTRAST RIGHT: ICD-10-PCS | Mod: 26,RT,, | Performed by: RADIOLOGY

## 2021-08-17 PROCEDURE — 73221 MRI JOINT UPR EXTREM W/O DYE: CPT | Mod: 26,RT,, | Performed by: RADIOLOGY

## 2021-08-17 PROCEDURE — 73221 MRI JOINT UPR EXTREM W/O DYE: CPT | Mod: TC,RT

## 2023-04-10 NOTE — ANESTHESIA PROCEDURE NOTES
Intubation  Performed by: Nasrin Mandel CRNA  Authorized by: Nasrin Mandel CRNA     Intubation:     Induction:  Intravenous    Intubated:  Postinduction    Mask Ventilation:  Easy with oral airway    Attempts:  1    Attempted By:  Staff anesthesiologist    Method of Intubation:  Video laryngoscopy    Blade:  Glidescope 3    Laryngeal View Grade: Grade I - full view of chords      Difficult Airway Encountered?: No      Complications:  None    Airway Device:  Oral endotracheal tube    Airway Device Size:  7.5    Style/Cuff Inflation:  Cuffed    Inflation Amount (mL):  5    Tube secured:  21    Placement Verified By:  Capnometry    Complicating Factors:  None    Findings Post-Intubation:  BS equal bilateral         30

## (undated) DEVICE — DRESSING XEROFORM FOIL PK 1X8

## (undated) DEVICE — NDL 18GA X1 1/2 REG BEVEL

## (undated) DEVICE — SYR 10CC LUER LOCK

## (undated) DEVICE — BLADE SHAVER 4.5 6/BX

## (undated) DEVICE — DRG DOC 8.0 X 85MM

## (undated) DEVICE — DRAPE STERI INSTRUMENT 1018

## (undated) DEVICE — TUBE SET INFLOW/OUTFLOW

## (undated) DEVICE — APPLICATOR CHLORAPREP ORN 26ML

## (undated) DEVICE — SEE MEDLINE ITEM 146347

## (undated) DEVICE — GLOVE BIOGEL SKINSENSE PI 7.0

## (undated) DEVICE — SHAVER ULTRAFFR 4.2MM

## (undated) DEVICE — Device

## (undated) DEVICE — ADHESIVE MASTISOL VIAL 48/BX

## (undated) DEVICE — CANNULA DRY DOC 7 X 85

## (undated) DEVICE — SEE MEDLINE ITEM 146420

## (undated) DEVICE — DRAPE STERI U-SHAPED 47X51IN

## (undated) DEVICE — SOL IRR NACL .9% 3000ML

## (undated) DEVICE — PAD ELECTRODE STER 1.5X3

## (undated) DEVICE — GLOVE SURGEON SYN PF SZ 9

## (undated) DEVICE — SEE MEDLINE ITEM 157117

## (undated) DEVICE — SEE MEDLINE ITEM 152530

## (undated) DEVICE — SUT MCRYL PLUS 4-0 PS2 27IN

## (undated) DEVICE — GOWN SMART IMP BREATHABLE XXLG

## (undated) DEVICE — PAD SHOULDER CARE POLAR

## (undated) DEVICE — NDL SPINAL 18GX3.5 SPINOCAN

## (undated) DEVICE — GOWN SMARTGOWN LVL4 X-LONG XL

## (undated) DEVICE — CLOSURE SKIN STERI STRIP 1/2X4

## (undated) DEVICE — SEE MEDLINE ITEM 146313

## (undated) DEVICE — UNDERGLOVES BIOGEL PI SIZE 7.5

## (undated) DEVICE — KIT SHOULDER POSITIONER SPIDER

## (undated) DEVICE — POSITIONER IV ARMBOARD FOAM

## (undated) DEVICE — SUPPORT SLING SHOT II MEDIUM

## (undated) DEVICE — SEE MEDLINE ITEM 152529

## (undated) DEVICE — NDL SCORPIAN SUTURE PASSER

## (undated) DEVICE — ANCHOR SU BC CORKSCREW 5.5MM
Type: IMPLANTABLE DEVICE | Site: SHOULDER | Status: NON-FUNCTIONAL
Removed: 2020-01-09

## (undated) DEVICE — ELECTRODE REM PLYHSV RETURN 9

## (undated) DEVICE — PROBE ARTHO ENERGY 90 DEG

## (undated) DEVICE — SOL 9P NACL IRR PIC IL

## (undated) DEVICE — GAUZE SPONGE 4X4 12PLY

## (undated) DEVICE — GLOVE BIOGEL ECLIPSE SZ 9

## (undated) DEVICE — GLOVE ORTHO PF SZ 8.5

## (undated) DEVICE — PAD ABD 8X10 STERILE